# Patient Record
Sex: FEMALE | Race: WHITE | NOT HISPANIC OR LATINO | Employment: FULL TIME | URBAN - METROPOLITAN AREA
[De-identification: names, ages, dates, MRNs, and addresses within clinical notes are randomized per-mention and may not be internally consistent; named-entity substitution may affect disease eponyms.]

---

## 2017-02-10 ENCOUNTER — HOSPITAL ENCOUNTER (OUTPATIENT)
Facility: MEDICAL CENTER | Age: 37
End: 2017-02-10
Attending: FAMILY MEDICINE
Payer: MEDICAID

## 2017-02-10 ENCOUNTER — OFFICE VISIT (OUTPATIENT)
Dept: MEDICAL GROUP | Facility: MEDICAL CENTER | Age: 37
End: 2017-02-10
Attending: FAMILY MEDICINE
Payer: MEDICAID

## 2017-02-10 VITALS
TEMPERATURE: 97.9 F | HEART RATE: 80 BPM | HEIGHT: 62 IN | RESPIRATION RATE: 14 BRPM | WEIGHT: 125 LBS | SYSTOLIC BLOOD PRESSURE: 128 MMHG | OXYGEN SATURATION: 94 % | BODY MASS INDEX: 23 KG/M2 | DIASTOLIC BLOOD PRESSURE: 82 MMHG

## 2017-02-10 DIAGNOSIS — H50.9 STRABISMUS: ICD-10-CM

## 2017-02-10 DIAGNOSIS — Z79.891 CHRONIC USE OF OPIATE DRUGS THERAPEUTIC PURPOSES: ICD-10-CM

## 2017-02-10 DIAGNOSIS — F11.20 NARCOTIC DEPENDENCE, EPISODIC USE (HCC): ICD-10-CM

## 2017-02-10 DIAGNOSIS — M54.50 CHRONIC LOW BACK PAIN WITHOUT SCIATICA, UNSPECIFIED BACK PAIN LATERALITY: ICD-10-CM

## 2017-02-10 DIAGNOSIS — G89.29 CHRONIC LOW BACK PAIN WITHOUT SCIATICA, UNSPECIFIED BACK PAIN LATERALITY: ICD-10-CM

## 2017-02-10 PROCEDURE — 99214 OFFICE O/P EST MOD 30 MIN: CPT | Performed by: FAMILY MEDICINE

## 2017-02-10 PROCEDURE — 99204 OFFICE O/P NEW MOD 45 MIN: CPT | Performed by: FAMILY MEDICINE

## 2017-02-10 PROCEDURE — 80307 DRUG TEST PRSMV CHEM ANLYZR: CPT

## 2017-02-10 PROCEDURE — G0480 DRUG TEST DEF 1-7 CLASSES: HCPCS

## 2017-02-10 RX ORDER — MORPHINE SULFATE 15 MG/1
15 TABLET, FILM COATED, EXTENDED RELEASE ORAL 3 TIMES DAILY PRN
Qty: 21 TAB | Refills: 0 | Status: SHIPPED | OUTPATIENT
Start: 2017-02-10 | End: 2022-05-28

## 2017-02-10 RX ORDER — GABAPENTIN 300 MG/1
CAPSULE ORAL
Qty: 120 CAP | Refills: 3 | Status: SHIPPED | OUTPATIENT
Start: 2017-02-10 | End: 2017-02-27 | Stop reason: SDUPTHER

## 2017-02-10 ASSESSMENT — ENCOUNTER SYMPTOMS: DEPRESSION: 0

## 2017-02-10 ASSESSMENT — LIFESTYLE VARIABLES: HISTORY_ALCOHOL_USE: 0

## 2017-02-10 NOTE — MR AVS SNAPSHOT
"Lesia Ladd   2/10/2017 9:30 AM   Office Visit   MRN: [id number]    [facility]   Dept Phone:  [phone]    Description:  Female : 1980   Provider:  Ash Meza M.D.           Allergies as of 2/10/2017     No Known Allergies      You were diagnosed with     Chronic low back pain without sciatica, unspecified back pain laterality   [7950236]       Narcotic dependence, episodic use (CMS-[facility])   [[id number]]       Chronic use of opiate drugs therapeutic purposes   [[id number]]         Vital Signs     Blood Pressure Pulse Temperature Respirations Height Weight    128/82 mmHg 80 36.6 °C (97.9 °F) 14 1.575 m (5' 2\") 56.7 kg (125 lb)    Body Mass Index Oxygen Saturation Smoking Status             22.86 kg/m2 94% Current Every Day Smoker         Basic Information     Date Of Birth Sex Race Ethnicity Preferred Language    1980 Female White Non- English      Your appointments     2017  2:30 PM   Established Patient with Ash Meza M.D.   [facility]    [address] [place] [id number] [phone]           You will be receiving a confirmation call a few days before your appointment from our automated call confirmation system.            Mar 24, 2017 10:00 AM   Initial Psychiatric Eval with Jose Youngblood M.D.   [facility] [address] [place] [address])    [address] [place] [id number]   [phone]              Health Maintenance        Date Due Completion Dates    IMM DTaP/Tdap/Td Vaccine (1 - Tdap) 1999 ---    IMM INFLUENZA (1) 2016 ---    PAP SMEAR 2018, 2015 (Done), 10/8/2010    Override on 2015: Done            Current Immunizations     No immunizations on file.      Below and/or attached are the medications your provider expects you to take. Review all of your home medications and newly ordered medications with your provider and/or pharmacist. Follow medication instructions as " directed by your provider and/or pharmacist. Please keep your medication list with you and share with your provider. Update the information when medications are discontinued, doses are changed, or new medications (including over-the-counter products) are added; and carry medication information at all times in the event of emergency situations     Allergies:  No Known Allergies          Medications  Valid as of: February 10, 2017 - 10:33 AM    Generic Name Brand Name Tablet Size Instructions for use    Gabapentin (Cap) NEURONTIN 300 MG 1 by mouth daily at bedtime ×2 days then 1 by mouth twice a day ×2 days, then 2 by mouth daily at bedtime with one in the morning ×2 days then 2 by mouth twice a day        Morphine Sulfate (Tab CR) MS CONTIN 15 MG Take 1 Tab by mouth 3 times a day as needed.        Naproxen (Tab) NAPROSYN 500 MG Take 500 mg by mouth 2 times a day, with meals.        Oxycodone-Acetaminophen (Tab) PERCOCET 5-325 MG Take 1 Tab by mouth every four hours as needed (pain).        Oxycodone-Acetaminophen (Tab) PERCOCET 5-325 MG Take 1-2 Tabs by mouth every four hours as needed (pain).        .                 Medicines prescribed today were sent to:     [facility] [address] [place] [address] - [address] [address]    Phone: [phone] Fax: [phone]    Open 24 Hours?: No      Medication refill instructions:       If your prescription bottle indicates you have medication refills left, it is not necessary to call your provider’s office. Please contact your pharmacy and they will refill your medication.    If your prescription bottle indicates you do not have any refills left, you may request refills at any time through one of the following ways: The online Team My Mobile system (except Urgent Care), by calling your provider’s office, or by asking your pharmacy to contact your provider’s office with a refill request. Medication refills are processed only during regular  business hours and may not be available until the next business day. Your provider may request additional information or to have a follow-up visit with you prior to refilling your medication.   *Please Note: Medication refills are assigned a new Rx number when refilled electronically. Your pharmacy may indicate that no refills were authorized even though a new prescription for the same medication is available at the pharmacy. Please request the medicine by name with the pharmacy before contacting your provider for a refill.        Your To Do List     Future Labs/Procedures Complete By Expires    DX-LUMBAR SPINE-4+ VIEWS  As directed 2/10/2018    PAIN MANAGEMENT PANEL, SCRN W/ RFLX TO QNT  As directed 2/10/2018    Comments:    Current Meds (name, sig, last dose):   Current outpatient prescriptions:   •  gabapentin, 1 by mouth daily at bedtime ×2 days then 1 by mouth twice a day ×2 days, then 2 by mouth daily at bedtime with one in the morning ×2 days then 2 by mouth twice a day  •  morphine ER, 15 mg, Oral, TID PRN  •  naproxen, 500 mg, Oral, BID WITH MEALS, 5/2/2014 at 1130  •  oxycodone-acetaminophen, 1-2 Tab, Oral, Q4HRS PRN, not taking  •  oxycodone-acetaminophen, 1 Tab, Oral, Q4HRS PRN, Not Taking          PAIN MANAGEMENT PANEL, SCRN W/ RFLX TO QNT  As directed 2/10/2018    Comments:    Current Meds (name, sig, last dose):   Current outpatient prescriptions:   •  gabapentin, 1 by mouth daily at bedtime ×2 days then 1 by mouth twice a day ×2 days, then 2 by mouth daily at bedtime with one in the morning ×2 days then 2 by mouth twice a day  •  morphine ER, 15 mg, Oral, TID PRN  •  naproxen, 500 mg, Oral, BID WITH MEALS, 5/2/2014 at 1130  •  oxycodone-acetaminophen, 1-2 Tab, Oral, Q4HRS PRN, not taking  •  oxycodone-acetaminophen, 1 Tab, Oral, Q4HRS PRN, Not Taking               MyChart Access Code: [email or web address]  Expires: 3/12/2017 10:33 AM    [employer]ek[employer]  A secure, online tool to manage your health information        [employer]’s Fieldwire® is a secure, online tool that connects you to your personalized health information from the privacy of your home -- day or night - making it very easy for you to manage your healthcare. Once the activation process is completed, you can even access your medical information using the Fieldwire martha, which is available for free in the Apple Martha store or Google Play store.     Fieldwire provides the following levels of access (as shown below):   My Chart Features   [facility] Primary [facility] Doctor [facility]  Specialists [facility]  Urgent  Care Non-[facility]  Primary Care  Doctor   Email your healthcare team securely and privately 24/7 X X X    Manage appointments: schedule your next appointment; view details of past/upcoming appointments X      Request prescription refills. X      View recent personal medical records, including lab and immunizations X X X X   View health record, including health history, allergies, medications X X X X   Read reports about your outpatient visits, procedures, consult and ER notes X X X X   See your discharge summary, which is a recap of your hospital and/or ER visit that includes your diagnosis, lab results, and care plan. X X       How to register for Fieldwire:  1. Go to  [email or web address].  2. Click on the Sign Up Now box, which takes you to the New Member Sign Up page. You will need to provide the following information:  a. Enter your Fieldwire Access Code exactly as it appears at the top of this page. (You will not need to use this code after you’ve completed the sign-up process. If you do not sign up before the expiration date, you must request a new code.)   b. Enter your date of birth.   c. Enter your home email address.   d. Click Submit, and follow the next screen’s instructions.  3. Create a Fieldwire ID. This will be your Fieldwire login ID and cannot be changed, so think of one that is secure and easy to remember.  4. Create a Fieldwire password. You can change your  password at any time.  5. Enter your Password Reset Question and Answer. This can be used at a later time if you forget your password.   6. Enter your e-mail address. This allows you to receive e-mail notifications when new information is available in Simmr.  7. Click Sign Up. You can now view your health information.    For assistance activating your Simmr account, call [phone]

## 2017-02-10 NOTE — PROGRESS NOTES
No chief complaint on file.        HISTORY OF THE PRESENT ILLNESS: Patient is a 37 y.o. female. This pleasant patient is here today to establish care, requests assistance terminating her use of narcotics, and be evaluated for chronic low back pain      Chronic low back pain without sciatica  Patient presents with daily current low back pain without radiation into either leg. She denies tripping over either leg but feels at times that she may have some mild weakness in her left lower leg. (She reports that this leg was casted when she was in high school for a patellar issue.) Patient denies any urinary incontinence, fecal incontinence, focal numbness. Patient reports that she repeatedly takes doses of narcotics to cope with this pain. She reports that she has a fairly physical job that involves a lot of walking at a [employer].    Narcotic dependence, episodic use (CMS-HCC)  Patient presents today as a new patient requesting assistance discontinuing narcotics. She reports that she will continuously by narcotics off the street to treat her low back pain. She has realized over the past year that she increasingly has buying narcotics to avoid getting into the more severe levels of drug withdrawal. She reports taking MS Contin 60 mg 3 times a day and will take that for several days. Then she will run out of pills and reports that within 48 hours of no narcotic she'll start to experience chills and sweats along with anxiety and disrupted sleep. She has reached a point where she would like to terminate her use of narcotics. In the past she reports abusing methamphetamine at approximately age 20 and subsequently was able to procure legal prescriptions for Adderall for a period of time. She denies any current use of alcohol or street drugs beyond the opiates discussed above. She reports that she is able to attend work. She also takes care of her 2 young children. .  ORT score-7   Social History-  2014, working, 2 younger children      Allergies: Review of patient's allergies indicates no known allergies.    Current Outpatient Prescriptions Ordered in [facility]   Medication Sig Dispense Refill   • gabapentin (NEURONTIN) 300 MG Cap 1 by mouth daily at bedtime ×2 days then 1 by mouth twice a day ×2 days, then 2 by mouth daily at bedtime with one in the morning ×2 days then 2 by mouth twice a day 120 Cap 3   • morphine ER (MS CONTIN) 15 MG Tab CR tablet Take 1 Tab by mouth 3 times a day as needed. 21 Tab 0   • naproxen (NAPROSYN) 500 MG TABS Take 500 mg by mouth 2 times a day, with meals.     • oxycodone-acetaminophen (PERCOCET) 5-325 MG TABS Take 1-2 Tabs by mouth every four hours as needed (pain). 10 Each 0   • oxycodone-acetaminophen (PERCOCET) 5-325 MG TABS Take 1 Tab by mouth every four hours as needed (pain). 8 Each 0     No current [facility]-ordered facility-administered medications on file.       Past Medical History   Diagnosis Date   • Bronchitis    • Pneumonia    • Ovarian cyst      S/p RSO for cyst with last pregnancy   • Migraine    • UTI of         Past Surgical History   Procedure Laterality Date   • Oophorectomy       The patient did not have a hysterectomy only a RSO,benign tumor   • Wrist exploration Left      age 17       Social History   Substance Use Topics   • Smoking status: Current Every Day Smoker -- 0.50 packs/day   • Smokeless tobacco: Never Used      Comment: <1/2ppd x 15 years   • Alcohol Use: No       Family History   Problem Relation Age of Onset   • Stroke Paternal Grandmother    • Cancer Neg Hx    • Diabetes Neg Hx    • Heart Disease Neg Hx        Review of Systems   Constitutional: Negative for fever, chills, weight loss and malaise/fatigue.   HENT: Negative for ear pain, nosebleeds, congestion, sore throat and neck pain.    Eyes: Negative for blurred vision.   Respiratory: Negative for cough, sputum production, shortness of breath and wheezing.    Cardiovascular: Negative for  "chest pain, palpitations, orthopnea and leg swelling.   Gastrointestinal: Negative for heartburn, nausea, vomiting and abdominal pain.   Genitourinary: Negative for dysuria, urgency and frequency.   Musculoskeletal: Positive for for myalgias, back pain and negative for joint pain.   Skin: Negative for rash and itching.   Neurological: Negative for dizziness, tingling, tremors, sensory change, focal weakness and headaches.   Endo/Heme/Allergies: Does not bruise/bleed easily.   Psychiatric/Behavioral: Negative for depression, anxiety, or memory loss.            Exam: Blood pressure 128/82, pulse 80, temperature 36.6 °C (97.9 °F), resp. rate 14, height 1.575 m (5' 2\"), weight 56.7 kg (125 lb), SpO2 94 %.  General: Normal appearing. No distress.  HEENT: Normocephalic. Eyes conjunctiva clear lids without ptosis, pupils equal and reactive to light accommodation, ears normal shape and contour, canals are clear bilaterally, tympanic membranes are benign, nasal mucosa benign, oropharynx is without erythema, edema or exudates.   Neck: Supple without JVD or bruit. Thyroid is not enlarged.  Pulmonary: Clear to ausculation.  Normal effort. No rales, ronchi, or wheezing.  Cardiovascular: Regular rate and rhythm without murmur. Carotid and radial pulses are intact and equal bilaterally.  Abdomen: Soft, nontender, nondistended. Normal bowel sounds. Liver and spleen are not palpable  Neurologic: Intact light touch and strength bilaterally,normal speech, no tremor, normal gait.   Lymph: No cervical, supraclavicular or axillary lymph nodes are palpable  Skin: Warm and dry.  No obvious lesions.  Musculoskeletal: Normal gait. No extremity cyanosis, clubbing, or edema.  Psych: Normal mood and affect. Alert and oriented x3. Judgment and insight is normal.  Back-1+ tender L4-S2 in the midline and the left para lumbar area without overlying redness or swelling.  Lower extremities-intact light touch and strength bilaterally. Normal " gait    Please note that this dictation was created using voice recognition software. I have made every reasonable attempt to correct obvious errors, but I expect that there are errors of grammar and possibly content that I did not discover before finalizing the note.      Assessment/Plan  1. Chronic low back pain without sciatica, unspecified back pain laterality  DX-LUMBAR SPINE-4+ VIEWS    Controlled Substance Treatment Agreement    PAIN MANAGEMENT PANEL, SCRN W/ RFLX TO QNT   2. Narcotic dependence, episodic use (CMS-HCC)  PAIN MANAGEMENT PANEL, SCRN W/ RFLX TO QNT   3. Chronic use of opiate drugs therapeutic purposes  PAIN MANAGEMENT PANEL, SCRN W/ RFLX TO QNT    Controlled Substance Treatment Agreement    PAIN MANAGEMENT PANEL, SCRN W/ RFLX TO QNT   Obtained and reviewed patient utilization report from [facility] database on 2/10/17, and based on assessment of report, the prescription forMS Contin 15mg is medically necessary    Plan: 1. Reduce reported narcotic dosing down to 15 mg morphine sulfate extended release 3 times daily  2. Urine drug screen today  3. Controlled substance agreement completed today  4. Begin gabapentin 300 mg at at bedtime with steady increase in dose over 8 days to 600 mg twice a day  5. Advise against concurrent use of benzodiazepine's with her narcotics  6. Revisit in 1 week

## 2017-02-10 NOTE — ASSESSMENT & PLAN NOTE
Patient presents today as a new patient requesting assistance discontinuing narcotics. She reports that she will continuously by narcotics off the street to treat her low back pain. She has realized over the past year that she increasingly has buying narcotics to avoid getting into the more severe levels of drug withdrawal. She reports taking MS Contin 60 mg 3 times a day and will take that for several days. Then she will run out of pills and reports that within 48 hours of no narcotic she'll start to experience chills and sweats along with anxiety and disrupted sleep. She has reached a point where she would like to terminate her use of narcotics. In the past she reports abusing methamphetamine at approximately age 20 and subsequently was able to procure legal prescriptions for Adderall for a period of time. She denies any current use of alcohol or street drugs beyond the opiates discussed above. She reports that she is able to attend work. She also takes care of her 2 young children. .

## 2017-02-10 NOTE — ASSESSMENT & PLAN NOTE
Patient presents with daily current low back pain without radiation into either leg. She denies tripping over either leg but feels at times that she may have some mild weakness in her left lower leg. (She reports that this leg was casted when she was in high school for a patellar issue.) Patient denies any urinary incontinence, fecal incontinence, focal numbness. Patient reports that she repeatedly takes doses of narcotics to cope with this pain. 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

## 2017-02-12 LAB
AMPHET CTO UR CFM-MCNC: NEGATIVE NG/ML
BARBITURATES CTO UR CFM-MCNC: NEGATIVE NG/ML
BENZODIAZ CTO UR CFM-MCNC: NEGATIVE NG/ML
BUPRENORPHINE UR-MCNC: NEGATIVE NG/ML
CANNABINOIDS CTO UR CFM-MCNC: NEGATIVE NG/ML
CARISOPRODOL UR-MCNC: NEGATIVE NG/ML
COCAINE CTO UR CFM-MCNC: NEGATIVE NG/ML
DRUG SCREEN COMMENT UR-IMP: NORMAL
ETHYL GLUCURONIDE UR QL SCN: NEGATIVE NG/ML
FENTANYL UR-MCNC: NEGATIVE NG/ML
MEPERIDINE CTO UR SCN-MCNC: NEGATIVE NG/ML
METHADONE CTO UR CFM-MCNC: NEGATIVE NG/ML
OPIATES UR QL SCN: POSITIVE NG/ML
OXYCDOXYM URSCRN 2005102: NEGATIVE NG/ML
PCP CTO UR CFM-MCNC: NEGATIVE NG/ML
PROPOXYPH CTO UR CFM-MCNC: NEGATIVE NG/ML
TAPENTADOL UR-MCNC: NEGATIVE NG/ML
TRAMADOL CTO UR SCN-MCNC: NEGATIVE NG/ML
ZOLPIDEM UR-MCNC: NEGATIVE NG/ML

## 2017-02-13 LAB
6MAM UR CFM-MCNC: <10 NG/ML
CODEINE UR CFM-MCNC: <20 NG/ML
HYDROCODONE UR CFM-MCNC: <20 NG/ML
HYDROMORPHONE UR CFM-MCNC: <20 NG/ML
MORPHINE UR CFM-MCNC: 1071 NG/ML
NORHYDROCODONE UR CFM-MCNC: 81 NG/ML
NOROXYCODONE UR CFM-MCNC: <20 NG/ML
OPIATES UR NOROXYM Q0836: <20 NG/ML
OXYCODONE UR CFM-MCNC: <20 NG/ML
OXYMORPHONE UR CFM-MCNC: <20 NG/ML

## 2017-02-27 RX ORDER — GABAPENTIN 300 MG/1
600 CAPSULE ORAL 2 TIMES DAILY
Qty: 120 CAP | Refills: 3 | Status: SHIPPED | OUTPATIENT
Start: 2017-02-27 | End: 2018-03-16

## 2017-02-27 NOTE — TELEPHONE ENCOUNTER
Pt called stating that her gabapentin works well she just finds that she needs to take more than prescribed sometimes. She would like to know if you would be willing to increase the strength of her medication. Please advise    Was the patient seen in the last year in this department? Yes     Does patient have an active prescription for medications requested? No     Received Request Via: Patient

## 2017-03-01 NOTE — TELEPHONE ENCOUNTER
Pt states that she is unable to fill the Rx for gabapentin since the strength has not changed. She is requesting an rx for 600mg bid. Please advise     Thank you

## 2017-03-02 ENCOUNTER — TELEPHONE (OUTPATIENT)
Dept: MEDICAL GROUP | Facility: MEDICAL CENTER | Age: 37
End: 2017-03-02

## 2017-03-02 RX ORDER — GABAPENTIN 600 MG/1
600 TABLET ORAL 2 TIMES DAILY
Qty: 60 TAB | Refills: 11 | Status: SHIPPED | OUTPATIENT
Start: 2017-03-02 | End: 2018-03-16

## 2017-04-18 ENCOUNTER — TELEPHONE (OUTPATIENT)
Dept: MEDICAL GROUP | Facility: MEDICAL CENTER | Age: 37
End: 2017-04-18

## 2017-04-18 ENCOUNTER — OFFICE VISIT (OUTPATIENT)
Dept: MEDICAL GROUP | Facility: MEDICAL CENTER | Age: 37
End: 2017-04-18
Attending: FAMILY MEDICINE
Payer: MEDICAID

## 2017-04-18 VITALS
OXYGEN SATURATION: 96 % | HEART RATE: 100 BPM | TEMPERATURE: 98.6 F | RESPIRATION RATE: 16 BRPM | HEIGHT: 62 IN | SYSTOLIC BLOOD PRESSURE: 124 MMHG | WEIGHT: 135 LBS | BODY MASS INDEX: 24.84 KG/M2 | DIASTOLIC BLOOD PRESSURE: 84 MMHG

## 2017-04-18 DIAGNOSIS — G89.29 CHRONIC LEFT-SIDED LOW BACK PAIN WITHOUT SCIATICA: ICD-10-CM

## 2017-04-18 DIAGNOSIS — F19.982: ICD-10-CM

## 2017-04-18 DIAGNOSIS — F11.20 NARCOTIC DEPENDENCE, EPISODIC USE (HCC): ICD-10-CM

## 2017-04-18 DIAGNOSIS — M54.50 CHRONIC LEFT-SIDED LOW BACK PAIN WITHOUT SCIATICA: ICD-10-CM

## 2017-04-18 PROCEDURE — 99212 OFFICE O/P EST SF 10 MIN: CPT | Performed by: FAMILY MEDICINE

## 2017-04-18 PROCEDURE — 99213 OFFICE O/P EST LOW 20 MIN: CPT | Performed by: FAMILY MEDICINE

## 2017-04-18 RX ORDER — GABAPENTIN 800 MG/1
800 TABLET ORAL 3 TIMES DAILY
Qty: 90 TAB | Refills: 6 | Status: SHIPPED | OUTPATIENT
Start: 2017-04-18 | End: 2017-05-12 | Stop reason: SDUPTHER

## 2017-04-18 RX ORDER — LORAZEPAM 0.5 MG/1
0.5 TABLET ORAL
Qty: 5 TAB | Refills: 0 | Status: SHIPPED | OUTPATIENT
Start: 2017-04-18 | End: 2022-05-28

## 2017-04-18 ASSESSMENT — PAIN SCALES - GENERAL: PAINLEVEL: NO PAIN

## 2017-04-18 ASSESSMENT — ENCOUNTER SYMPTOMS: BACK PAIN: 1

## 2017-04-18 NOTE — PROGRESS NOTES
"Subjective:      Lesia Ladd is a 37 y.o. female who presents with Back Pain            Back Pain     1. Narcotic dependence-patient reports she is now 5 days without taking any morphine or any other opiates. She wants to continue opiate free indefinitely and she reports she has discontinued opiates on 2 prior occasions and remained opiate free for up to 12 months. Notes difficulty staying asleep to clear over the past several nights and feels that she is getting very little restful sleep overnight. She interestingly is not experiencing significant sleep latency. When she awakens during the night she notices strong feelings of anxiety at that time area  2. Chronic lumbar pain-patient notes persistent daily low back ache with some radiation into her left left upper thigh. No difficulty with urination or defecation (no constipation off narcotics).    Review of Systems   Musculoskeletal: Positive for back pain.          Objective:     /84 mmHg  Pulse 100  Temp(Src) 37 °C (98.6 °F)  Resp 16  Ht 1.575 m (5' 2.01\")  Wt 61.236 kg (135 lb)  BMI 24.69 kg/m2  SpO2 96%  LMP 04/03/2017  Breastfeeding? No     Physical Exam  Gen.- alert, cooperative, in no acute distress  Neck- midline trachea, thyroid not enlarged or tender,supple, no cervical adenopathy  Chest-clear to auscultation and percussion with normal breath sounds. No retractions. Chest wall nontender  Cardiac- regular rhythm and rate. No murmur, thrill, or heave  Back-1+ tender left parasacral area.  Lower extremities-intact light touch. Intact strength. Normal gait  Psych-normal affect with good eye contact. Normal grooming. Oriented x4.            Assessment/Plan:     1. Narcotic dependence, episodic use (CMS-HCC)      2. Sleep disorder, drug-induced (CMS-HCC)    3. Chronic low back pain  Plan: 1. Continue off all opiates  2. Limited use, 5 doses of lorazepam 0.5 mg that can be taken once per night if needed for sleeplessness/anxiety  3. Revisit " 2-3 mos or sooner if needed  4. Increase gabapentin to 800 mg 3 times a day

## 2017-04-18 NOTE — MR AVS SNAPSHOT
"        Lesia Ladd   2017 7:30 AM   Office Visit   MRN: [id number]    [facility]   Dept Phone:  [phone]    Description:  Female : 1980   Provider:  Ash Meza M.D.           Reason for Visit     Back Pain           Allergies as of 2017     No Known Allergies      You were diagnosed with     Narcotic dependence, episodic use (CMS-HCC)   [035383]       Sleep disorder, drug-induced (CMS-HCC)   [835458]       Chronic left-sided low back pain without sciatica   [9225421]         Vital Signs     Blood Pressure Pulse Temperature Respirations Height Weight    124/84 mmHg 100 37 °C (98.6 °F) 16 1.575 m (5' 2.01\") 61.236 kg (135 lb)    Body Mass Index Oxygen Saturation Last Menstrual Period Breastfeeding? Smoking Status       24.69 kg/m2 96% 2017 No Current Every Day Smoker       Basic Information     Date Of Birth Sex Race Ethnicity Preferred Language    1980 Female White Non- English      Your appointments     May 04, 2017 11:00 AM   Initial Psychiatric Eval with Jose Youngblood M.D.   [facility] [address] [place] [address])    [address] [place] [address]  [address] [place] [id number]   [phone]              Problem List              ICD-10-CM Priority Class Noted - Resolved    Chronic low back pain without sciatica M54.5, G89.29   2/10/2017 - Present    Narcotic dependence, episodic use (CMS-HCC) F11.20   2/10/2017 - Present    Strabismus H50.9   2/10/2017 - Present      Health Maintenance        Date Due Completion Dates    IMM DTaP/Tdap/Td Vaccine (1 - Tdap) 1999 ---    PAP SMEAR 2018, 2015 (Done), 10/8/2010    Override on 2015: Done            Current Immunizations     No immunizations on file.      Below and/or attached are the medications your provider expects you to take. Review all of your home medications and newly ordered medications with your provider and/or pharmacist. Follow medication instructions as directed by your " provider and/or pharmacist. Please keep your medication list with you and share with your provider. Update the information when medications are discontinued, doses are changed, or new medications (including over-the-counter products) are added; and carry medication information at all times in the event of emergency situations     Allergies:  No Known Allergies          Medications  Valid as of: April 18, 2017 -  8:20 AM    Generic Name Brand Name Tablet Size Instructions for use    Gabapentin (Cap) NEURONTIN 300 MG Take 2 Caps by mouth 2 Times a Day. 1 by mouth daily at bedtime ×2 days then 1 by mouth twice a day ×2 days, then 2 by mouth daily at bedtime with one in the morning ×2 days then 2 by mouth twice a day        Gabapentin (Tab) NEURONTIN 600 MG Take 1 Tab by mouth 2 times a day.        Gabapentin (Tab) NEURONTIN 800 MG Take 1 Tab by mouth 3 times a day.        LORazepam (Tab) ATIVAN 0.5 MG Take 1 Tab by mouth 1 time daily as needed for Anxiety.        Morphine Sulfate (Tab CR) MS CONTIN 15 MG Take 1 Tab by mouth 3 times a day as needed.        Naproxen (Tab) NAPROSYN 500 MG Take 500 mg by mouth 2 times a day, with meals.        Oxycodone-Acetaminophen (Tab) PERCOCET 5-325 MG Take 1 Tab by mouth every four hours as needed (pain).        Oxycodone-Acetaminophen (Tab) PERCOCET 5-325 MG Take 1-2 Tabs by mouth every four hours as needed (pain).        .                 Medicines prescribed today were sent to:     [facility]ACY #[id number] - [place], [place] - [address] [address]    Phone: [phone] Fax: [phone]    Open 24 Hours?: No      Medication refill instructions:       If your prescription bottle indicates you have medication refills left, it is not necessary to call your provider’s office. Please contact your pharmacy and they will refill your medication.    If your prescription bottle indicates you do not have any refills left, you may request refills at any time through  one of the following ways: The online dbTwang system (except Urgent Care), by calling your provider’s office, or by asking your pharmacy to contact your provider’s office with a refill request. Medication refills are processed only during regular business hours and may not be available until the next business day. Your provider may request additional information or to have a follow-up visit with you prior to refilling your medication.   *Please Note: Medication refills are assigned a new Rx number when refilled electronically. Your pharmacy may indicate that no refills were authorized even though a new prescription for the same medication is available at the pharmacy. Please request the medicine by name with the pharmacy before contacting your provider for a refill.           dbTwang Access Code: [email or web address]  Expires: 5/18/2017  8:20 AM    dbTwang  A secure, online tool to manage your health information     [employer] dbTwang® is a secure, online tool that connects you to your personalized health information from the privacy of your home -- day or night - making it very easy for you to manage your healthcare. Once the activation process is completed, you can even access your medical information using the dbTwang martha, which is available for free in the Apple Martha store or Google Play store.     dbTwang provides the following levels of access (as shown below):   My Chart Features   [facility] Doctor [facility]  Specialists [facility]  Urgent  Care Non-[facility]  Primary Care  Doctor   Email your healthcare team securely and privately 24/7 X X X    Manage appointments: schedule your next appointment; view details of past/upcoming appointments X      Request prescription refills. X      View recent personal medical records, including lab and immunizations X X X X   View health record, including health history, allergies, medications X X X X   Read reports about your outpatient visits, procedures, consult and ER  notes X X X X   See your discharge summary, which is a recap of your hospital and/or ER visit that includes your diagnosis, lab results, and care plan. X X       How to register for [email or web address]:  1. Go to  [email or web address].  2. Click on the Sign Up Now box, which takes you to the New Member Sign Up page. You will need to provide the following information:  a. Enter your Hotlease.Com Access Code exactly as it appears at the top of this page. (You will not need to use this code after you’ve completed the sign-up process. If you do not sign up before the expiration date, you must request a new code.)   b. Enter your date of birth.   c. Enter your home email address.   d. Click Submit, and follow the next screen’s instructions.  3. Create a Hotlease.Com ID. This will be your Hotlease.Com login ID and cannot be changed, so think of one that is secure and easy to remember.  4. Create a Hotlease.Com password. You can change your password at any time.  5. Enter your Password Reset Question and Answer. This can be used at a later time if you forget your password.   6. Enter your e-mail address. This allows you to receive e-mail notifications when new information is available in [email or web address].  7. Click Sign Up. You can now view your health information.    For assistance activating your [email or web address] account, call [phone]        Quit Tobacco Information     Do you want to quit using tobacco?    Quitting tobacco decreases risks of cancer, heart and lung disease, increases life expectancy, improves sense of taste and smell, and increases spending money, among other benefits.    If you are thinking about quitting, we can help.  • [employer] Quit Tobacco Program: 616.447.6160  o Program occurs weekly for four weeks and includes pharmacist consultation on products to support quitting smoking or chewing tobacco. A provider referral is needed for pharmacist consultation.  • Tobacco Users Help Hotline: 9-789-QUIT-NOW (764-6657) or  https://[place].quitlogix.org/  o Free, confidential telephone and online coaching for [place] residents. Sessions are designed on a schedule that is convenient for you. Eligible clients receive free nicotine replacement therapy.  • Nationally: www.smokefree.gov  o Information and professional assistance to support both immediate and long-term needs as you become, and remain, a non-smoker. Smokefree.gov allows you to choose the help that best fits your needs.

## 2017-05-04 ENCOUNTER — APPOINTMENT (OUTPATIENT)
Dept: BEHAVIORAL HEALTH | Facility: PHYSICIAN GROUP | Age: 37
End: 2017-05-04

## 2017-05-05 ENCOUNTER — TELEPHONE (OUTPATIENT)
Dept: MEDICAL GROUP | Facility: MEDICAL CENTER | Age: 37
End: 2017-05-05

## 2017-05-05 NOTE — TELEPHONE ENCOUNTER
Wt gain associated with gabapentin is usually under 5 lb and would not occur in such a short time. Need to limit daily portions to allow wt reduction. Appetite suppressants are stimulants, not a good drug class for her. They also have minimal benefit.   B

## 2017-05-05 NOTE — TELEPHONE ENCOUNTER
1. Caller Name: Lesia                                           Call Back Number: [phone] (home)         Patient approves a detailed voicemail message: yes    Pt called stating that since using gabapentin she has gained about 20 pounds. She researched the gabapentin and it says that it could possibly cause weight gain. Pt is exercising daily and eating healthy, but is still gaining weight. She would like to know if you could Rx an appetite suppresant to help with this situation. Please advise

## 2017-05-12 ENCOUNTER — OFFICE VISIT (OUTPATIENT)
Dept: MEDICAL GROUP | Facility: MEDICAL CENTER | Age: 37
End: 2017-05-12
Attending: FAMILY MEDICINE
Payer: MEDICAID

## 2017-05-12 VITALS
DIASTOLIC BLOOD PRESSURE: 76 MMHG | BODY MASS INDEX: 24.84 KG/M2 | RESPIRATION RATE: 12 BRPM | TEMPERATURE: 99.1 F | HEIGHT: 62 IN | SYSTOLIC BLOOD PRESSURE: 110 MMHG | WEIGHT: 135 LBS | HEART RATE: 88 BPM | OXYGEN SATURATION: 98 %

## 2017-05-12 DIAGNOSIS — M54.50 CHRONIC LEFT-SIDED LOW BACK PAIN WITHOUT SCIATICA: ICD-10-CM

## 2017-05-12 DIAGNOSIS — M25.562 LEFT KNEE PAIN, UNSPECIFIED CHRONICITY: ICD-10-CM

## 2017-05-12 DIAGNOSIS — G89.29 CHRONIC LEFT-SIDED LOW BACK PAIN WITHOUT SCIATICA: ICD-10-CM

## 2017-05-12 PROCEDURE — 99212 OFFICE O/P EST SF 10 MIN: CPT | Performed by: FAMILY MEDICINE

## 2017-05-12 PROCEDURE — 99213 OFFICE O/P EST LOW 20 MIN: CPT | Performed by: FAMILY MEDICINE

## 2017-05-12 RX ORDER — GABAPENTIN 800 MG/1
1600 TABLET ORAL 3 TIMES DAILY
Qty: 180 TAB | Refills: 6 | Status: SHIPPED | OUTPATIENT
Start: 2017-05-12 | End: 2018-03-16

## 2017-05-12 RX ORDER — GABAPENTIN 300 MG/1
600 CAPSULE ORAL 3 TIMES DAILY
Qty: 20 CAP | Refills: 0 | Status: SHIPPED | OUTPATIENT
Start: 2017-05-12 | End: 2018-03-16

## 2017-05-12 ASSESSMENT — PATIENT HEALTH QUESTIONNAIRE - PHQ9: CLINICAL INTERPRETATION OF PHQ2 SCORE: 0

## 2017-05-12 ASSESSMENT — PAIN SCALES - GENERAL: PAINLEVEL: 3=SLIGHT PAIN

## 2017-05-12 NOTE — PROGRESS NOTES
"Subjective:      Lesia Ladd is a 37 y.o. female who presents with No chief complaint on file.            HPI 1. Chronic back pain-patient ports she has remained narcotic free now for 5 weeks. She reports that she has been relying heavily on gabapentin and is actually been taking 1600 mg 3 times daily with good relief of chronic back pain and recent left knee pain. She typically is not using NSAIDs or Tylenol. Denies any confusion, dizziness  2. Left knee pain-patient reports intermittent pain typically in the posterior aspect of her left knee on and off depending on her degree of physical activity dating back over several years. She does not report any recent specific injury to her left knee. She reports on questioning that it does lock frequently and rarely liv underneath her.  ROS negative for nausea, chest pain, dysuria, change in bowel habits       Objective:     /76 mmHg  Pulse 88  Temp(Src) 37.3 °C (99.1 °F)  Resp 12  Ht 1.575 m (5' 2.01\")  Wt 61.236 kg (135 lb)  BMI 24.69 kg/m2  SpO2 98%  LMP 05/04/2017  Breastfeeding? No     Physical Exam   Gen.- alert, cooperative, in no acute distress  Neck- midline trachea, thyroid not enlarged or tender,supple, no cervical adenopathy  Chest-clear to auscultation and percussion with normal breath sounds. No retractions. Chest wall nontender  Cardiac- regular rhythm and rate. No murmur, thrill, or heave  Psych-normal affect with good eye contact. Normal grooming. Oriented x4.  Left knee-negative for effusion redness or warmth. Mild tenderness on the opposite to the joint line with medial and lateral stress. Range of motion is 0-90° limited by discomfort and tightness. On palpation mildly tender over the hamstring insertions medially and laterally          Assessment/Plan:     1. Chronic left-sided low back pain without sciatica      2. Left knee pain, unspecified chronicity    Plan: 1. Patient is tolerating above normal doses of gabapentin 1600 mg " 3 times a day with good relief of her back/knee pain. Will reorder with a limited quantity of 300 mg size tablets to get her through the weekend  2. Patient declines formal evaluation of her left knee which is somewhat suggestive of a meniscal tear

## 2017-05-12 NOTE — MR AVS SNAPSHOT
"        Lesia Ladd   2017 12:50 PM   Office Visit   MRN: [id number]    Department[facility]   Dept Phone:  [phone]    Description:  Female : 1980   Provider:  Ash Meza M.D.           Reason for Visit     Follow-Up           Allergies as of 2017     No Known Allergies      You were diagnosed with     Chronic left-sided low back pain without sciatica   [1589967]       Left knee pain, unspecified chronicity   [3623383]         Vital Signs     Blood Pressure Pulse Temperature Respirations Height Weight    110/76 mmHg 88 37.3 °C (99.1 °F) 12 1.575 m (5' 2.01\") 61.236 kg (135 lb)    Body Mass Index Oxygen Saturation Last Menstrual Period Breastfeeding? Smoking Status       24.69 kg/m2 98% 2017 No Current Every Day Smoker       Basic Information     Date Of Birth Sex Race Ethnicity Preferred Language    1980 Female White Non- English      Problem List              ICD-10-CM Priority Class Noted - Resolved    Chronic low back pain without sciatica M54.5, G89.29   2/10/2017 - Present    Narcotic dependence, episodic use (CMS-HCC) F11.20   2/10/2017 - Present    Strabismus H50.9   2/10/2017 - Present      Health Maintenance        Date Due Completion Dates    IMM DTaP/Tdap/Td Vaccine (1 - Tdap) 1999 ---    PAP SMEAR 2018, 2015 (Done), 10/8/2010    Override on 2015: Done            Current Immunizations     No immunizations on file.      Below and/or attached are the medications your provider expects you to take. Review all of your home medications and newly ordered medications with your provider and/or pharmacist. Follow medication instructions as directed by your provider and/or pharmacist. Please keep your medication list with you and share with your provider. Update the information when medications are discontinued, doses are changed, or new medications (including over-the-counter products) are added; and carry medication " information at all times in the event of emergency situations     Allergies:  No Known Allergies          Medications  Valid as of: May 12, 2017 -  1:43 PM    Generic Name Brand Name Tablet Size Instructions for use    Gabapentin (Cap) NEURONTIN 300 MG Take 2 Caps by mouth 2 Times a Day. 1 by mouth daily at bedtime ×2 days then 1 by mouth twice a day ×2 days, then 2 by mouth daily at bedtime with one in the morning ×2 days then 2 by mouth twice a day        Gabapentin (Tab) NEURONTIN 600 MG Take 1 Tab by mouth 2 times a day.        Gabapentin (Tab) NEURONTIN 800 MG Take 2 Tabs by mouth 3 times a day.        Gabapentin (Cap) NEURONTIN 300 MG Take 2 Caps by mouth 3 times a day.        LORazepam (Tab) ATIVAN 0.5 MG Take 1 Tab by mouth 1 time daily as needed for Anxiety.        Morphine Sulfate (Tab CR) MS CONTIN 15 MG Take 1 Tab by mouth 3 times a day as needed.        Naproxen (Tab) NAPROSYN 500 MG Take 500 mg by mouth 2 times a day, with meals.        Oxycodone-Acetaminophen (Tab) PERCOCET 5-325 MG Take 1 Tab by mouth every four hours as needed (pain).        .                 Medicines prescribed today were sent to:     [facility]/PHARMACY #[id number] - [place], [place] - [address] [address]    Phone: [phone] Fax: [phone]    Open 24 Hours?: No      Medication refill instructions:       If your prescription bottle indicates you have medication refills left, it is not necessary to call your provider’s office. Please contact your pharmacy and they will refill your medication.    If your prescription bottle indicates you do not have any refills left, you may request refills at any time through one of the following ways: The online [employer] system (except Urgent Care), by calling your provider’s office, or by asking your pharmacy to contact your provider’s office with a refill request. Medication refills are processed only during regular business hours and may not be available until the next  business day. Your provider may request additional information or to have a follow-up visit with you prior to refilling your medication.   *Please Note: Medication refills are assigned a new Rx number when refilled electronically. Your pharmacy may indicate that no refills were authorized even though a new prescription for the same medication is available at the pharmacy. Please request the medicine by name with the pharmacy before contacting your provider for a refill.           MyChart Access Code: Activation code not generated  Current MyChart Status: Active          Quit Tobacco Information     Do you want to quit using tobacco?    Quitting tobacco decreases risks of cancer, heart and lung disease, increases life expectancy, improves sense of taste and smell, and increases spending money, among other benefits.    If you are thinking about quitting, we can help.  • [employer] Quit Tobacco Program: 599.117.7236  o Program occurs weekly for four weeks and includes pharmacist consultation on products to support quitting smoking or chewing tobacco. A provider referral is needed for pharmacist consultation.  • Tobacco Users Help Hotline: 2-789-QUIT-NOW (031-1446) or https://[place].quitlogix.org/  o Free, confidential telephone and online coaching for [place] residents. Sessions are designed on a schedule that is convenient for you. Eligible clients receive free nicotine replacement therapy.  • Nationally: www.smokefree.gov  o Information and professional assistance to support both immediate and long-term needs as you become, and remain, a non-smoker. Smokefree.gov allows you to choose the help that best fits your needs.

## 2017-05-15 ENCOUNTER — TELEPHONE (OUTPATIENT)
Dept: MEDICAL GROUP | Facility: MEDICAL CENTER | Age: 37
End: 2017-05-15

## 2017-05-15 RX ORDER — GABAPENTIN 800 MG/1
800 TABLET ORAL 4 TIMES DAILY
Qty: 120 TAB | Refills: 2 | Status: SHIPPED | OUTPATIENT
Start: 2017-05-15 | End: 2017-07-18 | Stop reason: SDUPTHER

## 2017-05-15 NOTE — TELEPHONE ENCOUNTER
1. Caller Name: Lesia                                        Call Back Number: [phone] (home)         Patient approves a detailed voicemail message: yes    Pt called stating that her Rx for Gabapentin 800mg requires a PA. She says that she discussed the possibility of this happening with you and that you stated that you would change it to her taking 4 pills daily for a total of 3200 mg  daily. Please advise   Thank you

## 2017-06-27 ENCOUNTER — OFFICE VISIT (OUTPATIENT)
Dept: MEDICAL GROUP | Facility: MEDICAL CENTER | Age: 37
End: 2017-06-27
Attending: FAMILY MEDICINE
Payer: MEDICAID

## 2017-06-27 VITALS
WEIGHT: 134 LBS | DIASTOLIC BLOOD PRESSURE: 72 MMHG | TEMPERATURE: 99 F | HEART RATE: 96 BPM | OXYGEN SATURATION: 95 % | HEIGHT: 62 IN | BODY MASS INDEX: 24.66 KG/M2 | SYSTOLIC BLOOD PRESSURE: 108 MMHG | RESPIRATION RATE: 16 BRPM

## 2017-06-27 DIAGNOSIS — G89.29 CHRONIC LEFT-SIDED LOW BACK PAIN WITH LEFT-SIDED SCIATICA: ICD-10-CM

## 2017-06-27 DIAGNOSIS — M54.6 ACUTE MIDLINE THORACIC BACK PAIN: ICD-10-CM

## 2017-06-27 DIAGNOSIS — M54.42 CHRONIC LEFT-SIDED LOW BACK PAIN WITH LEFT-SIDED SCIATICA: ICD-10-CM

## 2017-06-27 DIAGNOSIS — M54.50 CHRONIC LEFT-SIDED LOW BACK PAIN WITHOUT SCIATICA: ICD-10-CM

## 2017-06-27 DIAGNOSIS — G89.29 CHRONIC LEFT-SIDED LOW BACK PAIN WITHOUT SCIATICA: ICD-10-CM

## 2017-06-27 DIAGNOSIS — M25.512 CHRONIC LEFT SHOULDER PAIN: ICD-10-CM

## 2017-06-27 DIAGNOSIS — K21.9 GASTROESOPHAGEAL REFLUX DISEASE WITHOUT ESOPHAGITIS: ICD-10-CM

## 2017-06-27 DIAGNOSIS — Z00.00 HEALTHCARE MAINTENANCE: ICD-10-CM

## 2017-06-27 DIAGNOSIS — G89.29 CHRONIC LEFT SHOULDER PAIN: ICD-10-CM

## 2017-06-27 DIAGNOSIS — H50.9 STRABISMUS: ICD-10-CM

## 2017-06-27 DIAGNOSIS — H57.11 ACUTE RIGHT EYE PAIN: ICD-10-CM

## 2017-06-27 PROCEDURE — 99213 OFFICE O/P EST LOW 20 MIN: CPT | Performed by: FAMILY MEDICINE

## 2017-06-27 PROCEDURE — 99214 OFFICE O/P EST MOD 30 MIN: CPT | Performed by: FAMILY MEDICINE

## 2017-06-27 RX ORDER — CYCLOBENZAPRINE HCL 10 MG
10 TABLET ORAL 3 TIMES DAILY PRN
Qty: 90 TAB | Refills: 1 | Status: SHIPPED | OUTPATIENT
Start: 2017-06-27 | End: 2022-05-28

## 2017-06-27 RX ORDER — CYCLOBENZAPRINE HCL 10 MG
90 TABLET ORAL 3 TIMES DAILY PRN
Qty: 30 TAB | Refills: 0 | Status: SHIPPED | OUTPATIENT
Start: 2017-06-27 | End: 2017-06-27

## 2017-06-27 ASSESSMENT — PAIN SCALES - GENERAL: PAINLEVEL: 5=MODERATE PAIN

## 2017-06-27 NOTE — MR AVS SNAPSHOT
"        Lesia Ladd   2017 5:10 PM   Office Visit   MRN: [id number]    [facility]   Dept Phone:  [phone]    Description:  Female : 1980   Provider:  Ash Meza M.D.           Reason for Visit     Back Pain           Allergies as of 2017     No Known Allergies      You were diagnosed with     Healthcare maintenance   [377052]       Chronic left-sided low back pain with left-sided sciatica   [3397731]       Acute midline thoracic back pain   [5905935]       Acute right eye pain   [982913]       Strabismus   [419190]       Chronic left shoulder pain   [020447]         Vital Signs     Blood Pressure Pulse Temperature Respirations Height Weight    108/72 mmHg 96 37.2 °C (99 °F) 16 1.575 m (5' 2.01\") 60.782 kg (134 lb)    Body Mass Index Oxygen Saturation Last Menstrual Period Breastfeeding? Smoking Status       24.50 kg/m2 95% 2017 No Current Every Day Smoker       Basic Information     Date Of Birth Sex Race Ethnicity Preferred Language    1980 Female White Non- English      Your appointments     2017  4:30 PM   Established Patient with Ash Meza M.D.   [facility] ([facility])    [address] [place] [address] [id number] [phone]           You will be receiving a confirmation call a few days before your appointment from our automated call confirmation system.              Problem List              ICD-10-CM Priority Class Noted - Resolved    Chronic low back pain without sciatica M54.5, G89.29   2/10/2017 - Present    Narcotic dependence, episodic use (CMS-HCC) F11.20   2/10/2017 - Present    Strabismus H50.9   2/10/2017 - Present      Health Maintenance        Date Due Completion Dates    IMM DTaP/Tdap/Td Vaccine (1 - Tdap) 1999 ---    PAP SMEAR 2018, 2015 (Done), 10/8/2010    Override on 2015: Done            Current Immunizations     No immunizations on file.      Below and/or attached " are the medications your provider expects you to take. Review all of your home medications and newly ordered medications with your provider and/or pharmacist. Follow medication instructions as directed by your provider and/or pharmacist. Please keep your medication list with you and share with your provider. Update the information when medications are discontinued, doses are changed, or new medications (including over-the-counter products) are added; and carry medication information at all times in the event of emergency situations     Allergies:  No Known Allergies          Medications  Valid as of: June 27, 2017 -  5:55 PM    Generic Name Brand Name Tablet Size Instructions for use    Cyclobenzaprine HCl (Tab) FLEXERIL 10 MG Take 9 Tabs by mouth 3 times a day as needed.        Gabapentin (Cap) NEURONTIN 300 MG Take 2 Caps by mouth 2 Times a Day. 1 by mouth daily at bedtime ×2 days then 1 by mouth twice a day ×2 days, then 2 by mouth daily at bedtime with one in the morning ×2 days then 2 by mouth twice a day        Gabapentin (Tab) NEURONTIN 600 MG Take 1 Tab by mouth 2 times a day.        Gabapentin (Tab) NEURONTIN 800 MG Take 2 Tabs by mouth 3 times a day.        Gabapentin (Cap) NEURONTIN 300 MG Take 2 Caps by mouth 3 times a day.        Gabapentin (Tab) NEURONTIN 800 MG Take 1 Tab by mouth 4 times a day.        LORazepam (Tab) ATIVAN 0.5 MG Take 1 Tab by mouth 1 time daily as needed for Anxiety.        Morphine Sulfate (Tab CR) MS CONTIN 15 MG Take 1 Tab by mouth 3 times a day as needed.        .                 Medicines prescribed today were sent to:     [facility] #[id number] - [place] - [address] [place] [address]    Phone: [phone] Fax: [phone]    Open 24 Hours?: No      Medication refill instructions:       If your prescription bottle indicates you have medication refills left, it is not necessary to call your provider’s office. Please contact your pharmacy and they  will refill your medication.    If your prescription bottle indicates you do not have any refills left, you may request refills at any time through one of the following ways: The online Nvest system (except Urgent Care), by calling your provider’s office, or by asking your pharmacy to contact your provider’s office with a refill request. Medication refills are processed only during regular business hours and may not be available until the next business day. Your provider may request additional information or to have a follow-up visit with you prior to refilling your medication.   *Please Note: Medication refills are assigned a new Rx number when refilled electronically. Your pharmacy may indicate that no refills were authorized even though a new prescription for the same medication is available at the pharmacy. Please request the medicine by name with the pharmacy before contacting your provider for a refill.        Your To Do List     Future Labs/Procedures Complete By Expires    COMP METABOLIC PANEL  As directed 6/28/2018    DX-LUMBAR SPINE-4+ VIEWS  As directed 6/27/2018    DX-SHOULDER 2+ LEFT  As directed 6/27/2018    DX-THORACIC SPINE-2 VIEWS  As directed 6/27/2018    LIPID PROFILE  As directed 6/28/2018    TSH  As directed 6/28/2018      Referral     A referral request has been sent to our patient care coordination department. Please allow 3-5 business days for us to process this request and contact you either by phone or mail. If you do not hear from us by the 5th business day, please call us at [phone].           Nvest Access Code: Activation code not generated  Current Nvest Status: Active          Quit Tobacco Information     Do you want to quit using tobacco?    Quitting tobacco decreases risks of cancer, heart and lung disease, increases life expectancy, improves sense of taste and smell, and increases spending money, among other benefits.    If you are thinking about quitting, we can  help.  • [facility] Quit Tobacco Program: 501-857-4341  o Program occurs weekly for four weeks and includes pharmacist consultation on products to support quitting smoking or chewing tobacco. A provider referral is needed for pharmacist consultation.  • Tobacco Users Help Hotline: 0-865-QUIT-NOW (437-7663) or https://[place].quitlogix.org/  o Free, confidential telephone and online coaching for [place] residents. Sessions are designed on a schedule that is convenient for you. Eligible clients receive free nicotine replacement therapy.  • Nationally: www.smokefree.gov  o Information and professional assistance to support both immediate and long-term needs as you become, and remain, a non-smoker. Smokefree.gov allows you to choose the help that best fits your needs.

## 2017-06-28 NOTE — ASSESSMENT & PLAN NOTE
Patient reports persistent daily lower lumbosacral back pain with increasing radiation down her left leg all the way to her left foot. She denies urinary or fecal incontinence. She notes occasional numbness intermittently in her left foot in the past month. She overall gets significant relief from taking gabapentin but has been taking 6 tablets per day rather than the maximum for tablets of 800 mg each authorized by her health plan so she is almost out at this time until refill would be available around July 8. She never completed the requested lumbar spine x-rays in the early spring.

## 2017-06-28 NOTE — ASSESSMENT & PLAN NOTE
Patient reports past 2-3 months she has had recurrent sharp and dull pains in the intrascapular area. She denies any recent fall or trauma. Pain does not radiate into either upper extremity. No overlying rashes noted.

## 2017-06-28 NOTE — PROGRESS NOTES
"Chief Complaint   Patient presents with   • Back Pain       HISTORY OF PRESENT ILLNESS: Patient is a 37 y.o. female established patient who presents today to follow-up on chronic low back pain with sciatica        Chronic low back pain with sciatica  Patient reports persistent daily lower lumbosacral back pain with increasing radiation down her left leg all the way to her left foot. She denies urinary or fecal incontinence. She notes occasional numbness intermittently in her left foot in the past month. She overall gets significant relief from taking gabapentin but has been taking 6 tablets per day rather than the maximum for tablets of 800 mg each authorized by her health plan so she is almost out at this time until refill would be available around July 8. She never completed the requested lumbar spine x-rays in the early spring.     Gastroesophageal reflux disease without esophagitis  Patient reports she has been taking ibuprofen 200 mg, 4 pills 3 times a day, usually with food. She reports she started getting epigastric burning and substernal pain 2-3 weeks ago. She started taking what sounds like a over-the-counter low dose of ranitidine-? 75 mg, with resolution of the heartburn symptoms in the past week. She is not reporting black or bloody stools.    Strabismus  Patient reports intermittent strabismus dating back at least several years. Recently she repeatedly feels like there is a hair creating mild discomfort in her right eye intermittently. Typically there is no actual foreign object in her eye. She reports no obvious double vision, eye discharge or mattering. She does not wear glasses.    Chronic left shoulder pain  Patient notes intermittent aching just below her lateral left clavicle every day or 2 sometimes lasting all day sometimes just a few minutes. This is not related to effort. She reports that she is \"double jointed in her left shoulder and use to sublux it for entertainment value when she was a " kid. She has not done that for a number of years happily. She denies left arm numbness or radicular pain.    Acute midline thoracic back pain  Patient reports past 2-3 months she has had recurrent sharp and dull pains in the intrascapular area. She denies any recent fall or trauma. Pain does not radiate into either upper extremity. No overlying rashes noted.      Patient Active Problem List    Diagnosis Date Noted   • Acute midline thoracic back pain 06/27/2017   • Acute right eye pain 06/27/2017   • Chronic left shoulder pain 06/27/2017   • Gastroesophageal reflux disease without esophagitis 06/27/2017   • Chronic low back pain without sciatica 02/10/2017   • Narcotic dependence, episodic use (CMS-[facility]) 02/10/2017   • Strabismus 02/10/2017     Social history-, working  Allergies:Review of patient's allergies indicates no known allergies.    Current Outpatient Prescriptions   Medication Sig Dispense Refill   • cyclobenzaprine (FLEXERIL) 10 MG Tab Take 9 Tabs by mouth 3 times a day as needed. 30 Tab 0   • gabapentin (NEURONTIN) 800 MG tablet Take 1 Tab by mouth 4 times a day. 120 Tab 2   • gabapentin (NEURONTIN) 800 MG tablet Take 2 Tabs by mouth 3 times a day. 180 Tab 6   • gabapentin (NEURONTIN) 300 MG Cap Take 2 Caps by mouth 3 times a day. 20 Cap 0   • lorazepam (ATIVAN) 0.5 MG Tab Take 1 Tab by mouth 1 time daily as needed for Anxiety. 5 Tab 0   • gabapentin (NEURONTIN) 600 MG tablet Take 1 Tab by mouth 2 times a day. 60 Tab 11   • gabapentin (NEURONTIN) 300 MG Cap Take 2 Caps by mouth 2 Times a Day. 1 by mouth daily at bedtime ×2 days then 1 by mouth twice a day ×2 days, then 2 by mouth daily at bedtime with one in the morning ×2 days then 2 by mouth twice a day 120 Cap 3   • morphine ER (MS CONTIN) 15 MG Tab CR tablet Take 1 Tab by mouth 3 times a day as needed. 21 Tab 0     No current facility-administered medications for this visit.       Social History   Substance Use Topics   • Smoking status:  "Current Every Day Smoker -- 0.50 packs/day   • Smokeless tobacco: Never Used      Comment: <1/2ppd x 15 years   • Alcohol Use: No       Family History   Problem Relation Age of Onset   • Stroke Paternal Grandmother    • Cancer Neg Hx    • Diabetes Neg Hx    • Heart Disease Neg Hx        ROS:  Review of Systems   Constitutional: Negative for fever, chills, weight loss and malaise/fatigue.   -negative for vaginal discharge. Positive for history of cervix prolapse  Respiratory: Negative for cough, sputum production, shortness of breath and wheezing.    Cardiovascular: Negative for chest pain, palpitations, orthopnea and leg swelling.   . Endo/Heme/Allergies: Does not bruise/bleed easily.               Exam:  Blood pressure 108/72, pulse 96, temperature 37.2 °C (99 °F), resp. rate 16, height 1.575 m (5' 2.01\"), weight 60.782 kg (134 lb), last menstrual period 06/06/2017, SpO2 95 %, not currently breastfeeding.  General:  Well nourished, well developed female in NAD  Head is grossly normal. Eyes-intermittent moderate lateral wandering of the right eye noted. PERRLA. Sclera and conjunctiva are clear  Neck: Supple without JVD or bruit. Thyroid is not enlarged. Trachea is midline.  Pulmonary: Clear to ausculation .  Normal effort. No rales, ronchi, or wheezing.  Cardiovascular: Regular rate and rhythm without murmur.  Abdomen-Abdomen is soft, normal bowel sounds, no masses, guarding, ororganomegaly, or tenderness.  Lower extremities- neg for pretibial edema, redness, tenderness. Decreased touch over the lateral left thigh and left calf. Absent patellar and ankle jerks bilaterally  Upper extremities-lacks about 5° of full abduction at the left shoulder. 2+ tender over the acromioclavicular joint and just underneath the anterior left acromioclavicular area without mass. Intact light touch and distal strength bilaterally    Please note that this dictation was created using voice recognition software. I have made every " reasonable attempt to correct obvious errors, but I expect that there are errors of grammar and possibly content that I did not discover before finalizing the note.    Assessment/Plan:  1. Healthcare maintenance  TSH    COMP METABOLIC PANEL    LIPID PROFILE   2. Chronic left-sided low back pain with left-sided sciatica  DX-LUMBAR SPINE-4+ VIEWS   3. Acute midline thoracic back pain  DX-SHOULDER 2+ LEFT    DX-THORACIC SPINE-2 VIEWS   4. Acute right eye pain  REFERRAL TO OPHTHALMOLOGY   5. Strabismus  REFERRAL TO OPHTHALMOLOGY   6. Chronic left shoulder pain     7. Chronic left-sided low back pain without sciatica     8. Gastroesophageal reflux disease without esophagitis        Plan: 1. Collect TSH, CMP, fasting lipids  2. Ophthalmology referral  3. X-rays of the left shoulder, lumbar spine, thoracic spine  4. Flexeril 5-10 mg up to 3 times daily as needed for low back pain  5. Revisit one month  6. Continue low-dose ranitidine daily

## 2017-06-28 NOTE — ASSESSMENT & PLAN NOTE
Patient reports intermittent strabismus dating back at least several years. Recently she repeatedly feels like there is a hair creating mild discomfort in her right eye intermittently. Typically there is no actual foreign object in her eye. She reports no obvious double vision, eye discharge or mattering. She does not wear glasses.

## 2017-06-28 NOTE — ASSESSMENT & PLAN NOTE
Patient reports she has been taking ibuprofen 200 mg, 4 pills 3 times a day, usually with food. She reports she started getting epigastric burning and substernal pain 2-3 weeks ago. She started taking what sounds like a over-the-counter low dose of ranitidine-? 75 mg, with resolution of the heartburn symptoms in the past week. She is not reporting black or bloody stools.

## 2017-06-28 NOTE — ASSESSMENT & PLAN NOTE
"Patient notes intermittent aching just below her lateral left clavicle every day or 2 sometimes lasting all day sometimes just a few minutes. This is not related to effort. She reports that she is \"double jointed in her left shoulder and use to sublux it for entertainment value when she was a kid. She has not done that for a number of years happily. She denies left arm numbness or radicular pain.  "

## 2017-06-30 ENCOUNTER — HOSPITAL ENCOUNTER (OUTPATIENT)
Dept: LAB | Facility: MEDICAL CENTER | Age: 37
End: 2017-06-30
Attending: FAMILY MEDICINE
Payer: MEDICAID

## 2017-06-30 DIAGNOSIS — Z00.00 HEALTHCARE MAINTENANCE: ICD-10-CM

## 2017-06-30 LAB
ALBUMIN SERPL BCP-MCNC: 4.5 G/DL (ref 3.2–4.9)
ALBUMIN/GLOB SERPL: 1.5 G/DL
ALP SERPL-CCNC: 42 U/L (ref 30–99)
ALT SERPL-CCNC: 18 U/L (ref 2–50)
ANION GAP SERPL CALC-SCNC: 8 MMOL/L (ref 0–11.9)
AST SERPL-CCNC: 17 U/L (ref 12–45)
BILIRUB SERPL-MCNC: 0.3 MG/DL (ref 0.1–1.5)
BUN SERPL-MCNC: 14 MG/DL (ref 8–22)
CALCIUM SERPL-MCNC: 9.6 MG/DL (ref 8.5–10.5)
CHLORIDE SERPL-SCNC: 105 MMOL/L (ref 96–112)
CHOLEST SERPL-MCNC: 212 MG/DL (ref 100–199)
CO2 SERPL-SCNC: 21 MMOL/L (ref 20–33)
CREAT SERPL-MCNC: 0.89 MG/DL (ref 0.5–1.4)
GFR SERPL CREATININE-BSD FRML MDRD: >60 ML/MIN/1.73 M 2
GLOBULIN SER CALC-MCNC: 3 G/DL (ref 1.9–3.5)
GLUCOSE SERPL-MCNC: 129 MG/DL (ref 65–99)
HDLC SERPL-MCNC: 51 MG/DL
LDLC SERPL CALC-MCNC: 132 MG/DL
POTASSIUM SERPL-SCNC: 4.1 MMOL/L (ref 3.6–5.5)
PROT SERPL-MCNC: 7.5 G/DL (ref 6–8.2)
SODIUM SERPL-SCNC: 134 MMOL/L (ref 135–145)
TRIGL SERPL-MCNC: 143 MG/DL (ref 0–149)
TSH SERPL DL<=0.005 MIU/L-ACNC: 1.42 UIU/ML (ref 0.3–3.7)

## 2017-06-30 PROCEDURE — 80061 LIPID PANEL: CPT

## 2017-06-30 PROCEDURE — 36415 COLL VENOUS BLD VENIPUNCTURE: CPT

## 2017-06-30 PROCEDURE — 84443 ASSAY THYROID STIM HORMONE: CPT

## 2017-06-30 PROCEDURE — 80053 COMPREHEN METABOLIC PANEL: CPT

## 2017-07-10 DIAGNOSIS — E78.5 DYSLIPIDEMIA: ICD-10-CM

## 2017-07-11 ENCOUNTER — TELEPHONE (OUTPATIENT)
Dept: MEDICAL GROUP | Facility: MEDICAL CENTER | Age: 37
End: 2017-07-11

## 2017-07-12 NOTE — TELEPHONE ENCOUNTER
----- Message from Ash Meza M.D. sent at 7/3/2017  2:29 PM PDT -----  Labs show normal thyroid,mild elevation of  Cholesterol. Recommend limiting red meat , eggs, cheese intake

## 2017-07-18 RX ORDER — GABAPENTIN 800 MG/1
TABLET ORAL
Qty: 120 TAB | Refills: 6 | Status: SHIPPED | OUTPATIENT
Start: 2017-07-18 | End: 2017-10-16 | Stop reason: SDUPTHER

## 2017-07-25 RX ORDER — GABAPENTIN 300 MG/1
600 CAPSULE ORAL 2 TIMES DAILY
Qty: 180 CAP | Refills: 3 | OUTPATIENT
Start: 2017-07-25

## 2017-07-25 RX ORDER — GABAPENTIN 300 MG/1
600 CAPSULE ORAL 2 TIMES DAILY
Qty: 120 CAP | Refills: 6 | Status: SHIPPED | OUTPATIENT
Start: 2017-07-25 | End: 2018-03-16

## 2017-07-25 NOTE — TELEPHONE ENCOUNTER
Was the patient seen in the last year in this department? Yes     Does patient have an active prescription for medications requested? No     Received Request Via: Pharmacy, pt is without insurance at this time. She spoke with her pharmacy and they told her that this medication is significantly lower in price than the 800 mg tablets. Please advise

## 2017-10-16 RX ORDER — GABAPENTIN 800 MG/1
800 TABLET ORAL 4 TIMES DAILY
Qty: 120 TAB | Refills: 6 | Status: SHIPPED | OUTPATIENT
Start: 2017-10-16 | End: 2018-01-01 | Stop reason: SDUPTHER

## 2018-03-16 RX ORDER — GABAPENTIN 800 MG/1
TABLET ORAL
Qty: 120 TAB | Refills: 1 | Status: SHIPPED | OUTPATIENT
Start: 2018-03-16 | End: 2018-04-09 | Stop reason: SDUPTHER

## 2018-04-10 RX ORDER — GABAPENTIN 800 MG/1
TABLET ORAL
Qty: 120 TAB | Refills: 1 | Status: SHIPPED | OUTPATIENT
Start: 2018-04-10 | End: 2018-05-02 | Stop reason: SDUPTHER

## 2018-05-02 RX ORDER — GABAPENTIN 800 MG/1
TABLET ORAL
Qty: 120 TAB | Refills: 1 | Status: SHIPPED | OUTPATIENT
Start: 2018-05-02 | End: 2018-05-24 | Stop reason: SDUPTHER

## 2018-05-25 RX ORDER — GABAPENTIN 800 MG/1
TABLET ORAL
Qty: 120 TAB | Refills: 0 | Status: SHIPPED | OUTPATIENT
Start: 2018-05-25 | End: 2022-05-28

## 2022-04-18 ENCOUNTER — NON-PROVIDER VISIT (OUTPATIENT)
Dept: OCCUPATIONAL MEDICINE | Facility: CLINIC | Age: 42
End: 2022-04-18

## 2022-04-18 DIAGNOSIS — Z02.1 PRE-EMPLOYMENT DRUG SCREENING: ICD-10-CM

## 2022-04-18 DIAGNOSIS — Z02.1 PRE-EMPLOYMENT HEALTH SCREENING EXAMINATION: ICD-10-CM

## 2022-04-18 LAB
AMP AMPHETAMINE: NORMAL
COC COCAINE: NORMAL
INT CON NEG: NORMAL
INT CON POS: NORMAL
MET METHAMPHETAMINES: NORMAL
OPI OPIATES: NORMAL
PCP PHENCYCLIDINE: NORMAL
POC DRUG COMMENT 753798-OCCUPATIONAL HEALTH: NEGATIVE
THC: NORMAL

## 2022-04-18 PROCEDURE — 80305 DRUG TEST PRSMV DIR OPT OBS: CPT | Performed by: NURSE PRACTITIONER

## 2022-05-25 ENCOUNTER — APPOINTMENT (OUTPATIENT)
Dept: RADIOLOGY | Facility: IMAGING CENTER | Age: 42
End: 2022-05-25
Attending: PHYSICIAN ASSISTANT
Payer: COMMERCIAL

## 2022-05-25 ENCOUNTER — APPOINTMENT (OUTPATIENT)
Dept: RADIOLOGY | Facility: IMAGING CENTER | Age: 42
End: 2022-05-25
Attending: PHYSICIAN ASSISTANT
Payer: MEDICAID

## 2022-05-25 ENCOUNTER — OCCUPATIONAL MEDICINE (OUTPATIENT)
Dept: URGENT CARE | Facility: PHYSICIAN GROUP | Age: 42
End: 2022-05-25
Payer: COMMERCIAL

## 2022-05-25 VITALS
BODY MASS INDEX: 25.69 KG/M2 | DIASTOLIC BLOOD PRESSURE: 78 MMHG | TEMPERATURE: 98.9 F | OXYGEN SATURATION: 98 % | HEIGHT: 62 IN | RESPIRATION RATE: 16 BRPM | HEART RATE: 88 BPM | SYSTOLIC BLOOD PRESSURE: 118 MMHG | WEIGHT: 139.6 LBS

## 2022-05-25 DIAGNOSIS — M25.561 ACUTE PAIN OF RIGHT KNEE: ICD-10-CM

## 2022-05-25 DIAGNOSIS — M25.461 KNEE EFFUSION, RIGHT: ICD-10-CM

## 2022-05-25 DIAGNOSIS — Z02.1 PRE-EMPLOYMENT DRUG SCREENING: Primary | ICD-10-CM

## 2022-05-25 DIAGNOSIS — Q74.2 FIBULAR ABNORMALITY: ICD-10-CM

## 2022-05-25 DIAGNOSIS — Z02.6 ENCOUNTER FOR ASSESSMENT OF WORK-RELATED CAUSATION OF INJURY: ICD-10-CM

## 2022-05-25 PROCEDURE — 99203 OFFICE O/P NEW LOW 30 MIN: CPT | Performed by: PHYSICIAN ASSISTANT

## 2022-05-25 PROCEDURE — 80305 DRUG TEST PRSMV DIR OPT OBS: CPT | Performed by: PHYSICIAN ASSISTANT

## 2022-05-25 PROCEDURE — 73564 X-RAY EXAM KNEE 4 OR MORE: CPT | Mod: TC,RT | Performed by: PHYSICIAN ASSISTANT

## 2022-05-25 RX ORDER — HYDROCODONE BITARTRATE AND ACETAMINOPHEN 5; 325 MG/1; MG/1
1 TABLET ORAL EVERY 8 HOURS PRN
Qty: 12 TABLET | Refills: 0 | Status: SHIPPED | OUTPATIENT
Start: 2022-05-25 | End: 2022-06-01

## 2022-05-25 NOTE — LETTER
[facility]  [address] [place] [address]. [address] - [place] [address]  Phone:  [phone] - Fax:  [phone]   [facility] Progress Report and Disability Certification  Date of Service: 5/25/2022   No Show:  No  Date / Time of Next Visit: 5/28/2022 9:00am   Claim Information   Patient Name: Lesia Ladd  Claim Number:     Employer:   [employer] Date of Injury: 5/24/2022     Insurer / TPA:    ID / SSN:     Occupation:   Diagnosis: Diagnoses of Encounter for assessment of work-related causation of injury, Acute pain of right knee, Fibular abnormality, and Knee effusion, right were pertinent to this visit.    Medical Information   Related to Industrial Injury? 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  She was walking down the driveway, tripped on uneven concrete at one of the seems causing her right knee to twist abnormally and she fell onto her left side.  She did scrape up her elbow and knee on the left but felt immediate onset of pain to the right knee.  She reports immediate onset of pain.  She attempted to continue working but was unable to weight bear.  She has tried ibuprofen and aleve overnight.  She has tried an ice pack.     No prior knee inury and no second job.   Objective Findings:   Knee /Derm.:  There is significant swelling, to the knee.  There is focal tenderness over the fibular head.  There is tenderness along the course of the lateral collateral ligament.  There is tenderness around the medial and inferior patella.  There is tenderness along the medial joint line.  There is notable effusion.  There is tenderness over the patellar tendon and quadricep tendon.  Unable to assess drawer due to pain and swelling.  She is unable to weight-bear.  Distal pulses are intact.  There is no sign  of compartment syndrome.  Sensation is intact distally.       Pre-Existing Condition(s): None   Assessment:   Initial Visit    Status: Discharged / Care Transfer  Comments:Transfer care to orthopedics urgently  Permanent Disability:No    Plan: MedicationTransfer Care  Comments:Alternate Tylenol/ibuprofen as needed for pain, Norco as needed for severe pain at night.    Diagnostics: X-ray    Comments:   2022 11:46 AM     HISTORY/REASON FOR EXAM:  Pain/Deformity Following Trauma  Generalized right knee pain and swelling after twisting the knee during a GLF yesterday at work     TECHNIQUE/EXAM DESCRIPTION AND NUMBER OF VIEWS:  4 views of the RIGHT knee.     COMPARISON: None     FINDINGS:  Lucency in the fibular head. Lucency in the medial femoral condyle.     No joint osteoarthritis     Small knee joint effusion.     IMPRESSION:        Lucencies seen in the fibular head and medial femoral condyle, concerning for fracture. For evaluation with CT is recommended.    Disability Information   Status: Released to Restricted Duty    From:  2022  Through: 2022 Restrictions are: Temporary   Physical Restrictions   Sitting:    Standin hrs/day Stoopin hrs/day Bendin hrs/day   Squattin hrs/day Walkin hrs/day Climbin hrs/day Pushin hrs/day   Pullin hrs/day Other:    Reaching Above Shoulder (L):   Reaching Above Shoulder (R):       Reaching Below Shoulder (L):    Reaching Below Shoulder (R):      Not to exceed Weight Limits   Carrying(hrs):   Weight Limit(lb): < or = to 10 pounds Lifting(hrs):   Weight  Limit(lb): < or = to 10 pounds   Comments: Work restrictions as above  Seated work only, must be allowed to elevate leg and ice frequently throughout the day.  Tylenol/ibuprofen as needed for pain, Norco for severe pain at night  Urgent referral to orthopedics given lucencies seen on x-ray as above worrisome for fracture and ligamentous disruption  Ice 20 minutes every 1-2 hours,  elevate  Knee immobilizer and crutches, nonweightbearing on right lower extremity  Transfer of care to orthopedics where additional imaging will be needed   Return precautions discussed.    Repetitive Actions   Hands: i.e. Fine Manipulations from Grasping:     Feet: i.e. Operating Foot Controls: 0 hrs/day  Comments:right foot   Driving / Operate Machinery: 0 hrs/day   Health Care Provider’s Original or Electronic Signature  Shannon Mcgee P.A.-C. Health Care Provider’s Original or Electronic Signature    Román Redman MD         Clinic Name / Location: [address]  [place] [address] Clinic Phone Number: Dept: [phone]   Appointment Time: 10:25 Am Visit Start Time: 11:04 AM   Check-In Time:  10:28 Am Visit Discharge Time:  12:42PM   Original-Treating Physician or Chiropractor    Page 2-Insurer/TPA    Page 3-Employer    Page 4-Employee

## 2022-05-25 NOTE — PROGRESS NOTES
"Subjective     Lesia Ladd is a 42 y.o. female who presents with Work-Related Injury ( [employer]/R Knee injury/DOI 05/24/22. Pt was walking down side walk when she tripped over foot or concrete and injured R knee. Swelling, difficulty bending/applying pressure. 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  She was walking down the driveway, tripped on uneven concrete at one of the seems causing her right knee to twist abnormally and she fell onto her left side.  She did scrape up her elbow and knee on the left but felt immediate onset of pain to the right knee.  She reports immediate onset of pain.  She attempted to continue working but was unable to weight bear.  She has tried ibuprofen and aleve overnight.  She has tried an ice pack.     No prior knee inury and no second job.            Objective     /78 (BP Location: Right arm, Patient Position: Sitting, BP Cuff Size: Adult)   Pulse 88   Temp 37.2 °C (98.9 °F) (Temporal)   Resp 16   Ht 1.575 m (5' 2\")   Wt 63.3 kg (139 lb 9.6 oz)   SpO2 98%   BMI 25.53 kg/m²      Physical Exam  Vitals and nursing note reviewed.   Constitutional:       General: She is not in acute distress.     Appearance: Normal appearance. She is not ill-appearing.   HENT:      Head: Normocephalic.   Eyes:      Extraocular Movements: Extraocular movements intact.      Pupils: Pupils are equal, round, and reactive to light.   Cardiovascular:      Rate and Rhythm: Normal rate.   Pulmonary:      Effort: Pulmonary effort is normal.   Skin:     General: Skin is warm.      Findings: No rash.   Neurological:      Mental Status: She is alert and oriented to person, place, and time.   Psychiatric:         Thought Content: Thought content normal.         Judgment: Judgment normal.           Knee /Derm.:  There is significant swelling, to the knee.  There is focal " tenderness over the fibular head.  There is tenderness along the course of the lateral collateral ligament.  There is tenderness around the medial and inferior patella.  There is tenderness along the medial joint line.  There is notable effusion.  There is tenderness over the patellar tendon and quadricep tendon.  Unable to assess drawer due to pain and swelling.  She is unable to weight-bear.  Distal pulses are intact.  There is no sign of compartment syndrome.  Sensation is intact distally.         RADIOLOGY RESULTS   DX-KNEE COMPLETE 4+ RIGHT    Result Date: 5/25/2022 5/25/2022 11:46 AM HISTORY/REASON FOR EXAM:  Pain/Deformity Following Trauma Generalized right knee pain and swelling after twisting the knee during a GLF yesterday at work TECHNIQUE/EXAM DESCRIPTION AND NUMBER OF VIEWS:  4 views of the RIGHT knee. COMPARISON: None FINDINGS: Lucency in the fibular head. Lucency in the medial femoral condyle. No joint osteoarthritis Small knee joint effusion.     Lucencies seen in the fibular head and medial femoral condyle, concerning for fracture. For evaluation with CT is recommended.            *X-rays were reviewed and interpreted independently by me. I agree with the radiologist's findings       Assessment & Plan        1. Encounter for assessment of work-related causation of injury    - Referral to Orthopedics    2. Acute pain of right knee    - DX-KNEE COMPLETE 4+ RIGHT; Future  - Referral to Orthopedics    3. Fibular abnormality    - Referral to Orthopedics    4. Knee effusion, right       Work restrictions as above  Seated work only, must be allowed to elevate leg and ice frequently throughout the day.  Tylenol/ibuprofen as needed for pain, Norco for severe pain at night  Urgent referral to orthopedics given lucencies seen on x-ray as above worrisome for fracture and ligamentous disruption  Ice 20 minutes every 1-2 hours, elevate  Knee immobilizer and crutches, nonweightbearing on right lower  extremity  Transfer of care to orthopedics where additional imaging will be needed   Return precautions discussed.

## 2022-05-28 ENCOUNTER — OCCUPATIONAL MEDICINE (OUTPATIENT)
Dept: URGENT CARE | Facility: PHYSICIAN GROUP | Age: 42
End: 2022-05-28
Payer: COMMERCIAL

## 2022-05-28 VITALS
SYSTOLIC BLOOD PRESSURE: 108 MMHG | RESPIRATION RATE: 20 BRPM | TEMPERATURE: 97.9 F | HEART RATE: 83 BPM | BODY MASS INDEX: 25.58 KG/M2 | DIASTOLIC BLOOD PRESSURE: 72 MMHG | HEIGHT: 62 IN | WEIGHT: 139 LBS | OXYGEN SATURATION: 98 %

## 2022-05-28 DIAGNOSIS — Z02.6 ENCOUNTER FOR ASSESSMENT OF WORK-RELATED CAUSATION OF INJURY: ICD-10-CM

## 2022-05-28 DIAGNOSIS — M25.561 ACUTE PAIN OF RIGHT KNEE: ICD-10-CM

## 2022-05-28 PROCEDURE — 99213 OFFICE O/P EST LOW 20 MIN: CPT | Performed by: PHYSICIAN ASSISTANT

## 2022-05-28 RX ORDER — PREDNISONE 10 MG/1
40 TABLET ORAL 2 TIMES DAILY
Qty: 40 TABLET | Refills: 0 | Status: SHIPPED | OUTPATIENT
Start: 2022-05-28 | End: 2022-06-02

## 2022-05-28 NOTE — LETTER
[facility]  [address] [place] [address]. [address] - [place] [address]  Phone:  [phone] - Fax:  [phone]   [facility] Progress Report and Disability Certification  Date of Service: 5/28/2022   No Show:  No  Date / Time of Next Visit:     Claim Information   Patient Name: Lesia Ladd  Claim Number:     Employer:    Date of Injury: 5/24/2022     Insurer / TPA: [employer]  ID / SSN:     Occupation:   Diagnosis: Diagnoses of Acute pain of right knee and Encounter for assessment of work-related causation of injury were pertinent to this visit.    Medical Information   Related to Industrial Injury? Yes    Subjective Complaints:  DOI 05/24/22   MAGDALENA: Pt was walking down sidewalk when she tripped over foot or concrete and injured R knee.    Knee symptoms are same as LOV, no worsing of symptoms. Limited ability for weight bearing. Symptoms are worsened with pivoting movements. Has been wearing brace at all times.  Pain has been keeping her up at night.  Taking aleve and IBU    No pre-existing conditions/injuries with the right knee.  Patient does not have a secondary job   Objective Findings: Right knee joint effusion, ttp over medial and lateral joint line, limited AROM due to pain, antalgic gait.   Pre-Existing Condition(s):     Assessment:   Condition Same    Status: Additional Care Required  Comments:Pending orthopedics referral  Permanent Disability:No    Plan:   Comments:We will prescribe prednisone x5 days, continue use of the brace, continue to take over-the-counter anti-inflammatory medications, follow-up in 7 days for reevaluation at the urgent care or with orthopedics if appointment is scheduled    Diagnostics:      Comments:       Disability Information   Status: Released to Restricted Duty    From:     Through:   Restrictions are:     Physical Restrictions   Sitting:    Standing:    Stooping:    Bending:      Squatting:     Walking:    Climbing:    Pushing:      Pulling:    Other:    Reaching Above Shoulder (L):   Reaching Above Shoulder (R):       Reaching Below Shoulder (L):    Reaching Below Shoulder (R):      Not to exceed Weight Limits   Carrying(hrs):   Weight Limit(lb):   Lifting(hrs):   Weight  Limit(lb):     Comments:      Repetitive Actions   Hands: i.e. Fine Manipulations from Grasping:     Feet: i.e. Operating Foot Controls:     Driving / Operate Machinery:     Health Care Provider’s Original or Electronic Signature  Torres Shelley P.A.-C. Health Care Provider’s Original or Electronic Signature    Román Redman MD         Clinic Name / Location: [address]  [place] [address] Clinic Phone Number: Dept: [phone]   Appointment Time: 9:30 Am Visit Start Time: 9:23 AM   Check-In Time:  9:22 Am Visit Discharge Time:  9:56   Original-Treating Physician or Chiropractor    Page 2-Insurer/TPA    Page 3-Employer    Page 4-Employee

## 2022-05-28 NOTE — LETTER
"EMPLOYEE’S CLAIM FOR COMPENSATION/ REPORT OF INITIAL TREATMENT  FORM C-4    EMPLOYEE’S CLAIM - PROVIDE ALL INFORMATION REQUESTED   First Name  Lesia Last Name  Julee Birthdate                    1980                Sex  female Claim Number (Insurer’s Use Only)    Home Address  [address] Age  42 y.o. Height  1.575 m (5' 2\") Weight  63 kg (139 lb) [facility]     [facility] Zip  [address] Telephone  [phone] (home)    Mailing Address  [address] [facility] Zip  [address] Primary Language Spoken  English    Insurer   Third-Party   [facility]   Employee's Occupation (Job Title) When Injury or Occupational Disease Occurred      Employer's Name/Company Name     Telephone  [phone]    Office Mail Address (Number and Street)   [address]  Zip  [address]    Date of Injury  5/24/2022               Hours Injury  5:00 PM Date Employer Notified  5/24/2022 Last Day of Work after Injury     or Occupational Disease  5/24/2022 Supervisor to Whom Injury     Reported  Adin ARRIAGA   Address or Location of Accident (if applicable)  Work [1]   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 (Be specific an answer in detail. Use additional sheet if necessary)  Walking down their driveway,I trpped on uneven concrete at one of the seams .I fell when the trip laused my knee to twist .   If you believe that you have an occupational disease, when did you first have knowledge of the disability and it relationship to your employment?  N/A Witnesses to the Accident  N/A      Nature of Injury or Occupational Disease  Sprain  Part(s) of Body Injured or Affected  Knee (R), N/A, N/A    I certify that the above is true and correct to the best of my " knowledge and that I have provided this information in order to obtain the benefits of [place]  I hereby authorize any physician, chiropractor, surgeon, practitioner, or other person, any hospital, including [facility] or [facility], any medical service organization, any insurance company, or other institution or organization to release to each other, any medical or other information, including benefits paid or payable, pertinent to this injury or disease, except information relative to diagnosis, treatment and/or counseling for AIDS, psychological conditions, alcohol or controlled substances, for which I must give specific authorization.  A Photostat of this authorization shall be as valid as the original.     Date   Place Employee’s Original or  *Electronic Signature   THIS REPORT MUST BE COMPLETED AND MAILED WITHIN 3 WORKING DAYS OF TREATMENT   Place  [facility]  Name of Facility  [place]   Date  5/28/2022 Diagnosis and Description of Injury or Occupational Disease  (M25.561) Acute pain of right knee  (Z02.6) Encounter for assessment of work-related causation of injury Is there evidence the injured employee was under the influence of alcohol and/or another controlled substance at the time of accident?  ? No ? Yes (if yes, please explain)    Hour  9:23 AM   Diagnoses of Acute pain of right knee and Encounter for assessment of work-related causation of injury were pertinent to this visit. No   Treatment  We will prescribe prednisone x5 days  continue use of the brace  continue to take over-the-counter anti-inflammatory medications  follow-up in 7 days for reevaluation at the urgent care or with orthopedics if appointment is scheduled  Have you advised the patient to remain off work five days or     more?    X-Ray Findings      ? Yes Indicate dates:   From   To     "  From information given by the employee, together with medical evidence, can        you directly connect this injury or occupational disease as job incurred?  Yes ? No If no, is the injured employee capable of:  ? full duty  No ? modified duty  Yes   Is additional medical care by a physician indicated?  Yes If Modified Duty, Specify any Limitations / Restrictions  Desk work only.  No lifting or carrying greater than 5 pounds   Do you know of any previous injury or disease contributing to this condition or occupational disease?  ? Yes ? No (Explain if yes)                          No   Date  5/28/2022 Print Health Care Provider's   Jarrett Shelley P.A.-C. I certify the employer’s copy of  this form was mailed on:   Address  [address] [place] [address]. [address] Insurer’s Use Only     [facility] Zip  [address]    Provider’s Tax ID Number  [id number] Telephone  Dept: [phone]             Health Care Provider’s Original or Electronic Signature  e-JARRETT Avalos P.A.-C. Degree (MD,DO, DC,PASAI,APRN)   PASAI      * Complete and attach Release of Information (Form C-4A) when injured employee signs C-4 Form electronically  ORIGINAL - TREATING HEALTHCARE PROVIDER PAGE 2 - INSURER/TPA PAGE 3 - EMPLOYER PAGE 4 - EMPLOYEE             Form C-4 (rev.08/21)           BRIEF DESCRIPTION OF RIGHTS AND BENEFITS  (Pursuant to [place] 616C.050)    Notice of Injury or Occupational Disease (Incident Report Form C-1): If an injury or occupational disease (OD) arises out of and in the course of employment, you must provide written notice to your employer as soon as practicable, but no later than 7 days after the accident or OD. Your employer shall maintain a sufficient supply of the required forms.    Claim for Compensation (Form C-4): If medical treatment is sought, the form C-4 is available at the place of initial treatment. A completed \"Claim for Compensation\" (Form C-4) must be filed within 90 days after an accident or OD. The treating " physician or chiropractor must, within 3 working days after treatment, complete and mail to the employer, the employer's insurer and third-party , the Claim for Compensation.    Medical Treatment: If you require medical treatment for your on-the-job injury or OD, you may be required to select a physician or chiropractor from a list provided by your workers’ compensation insurer, if it has contracted with an Organization for Managed Care (MCO) or Preferred Provider Organization (PPO) or providers of health care. If your employer has not entered into a contract with an MCO or PPO, you may select a physician or chiropractor from the Panel of Physicians and Chiropractors. Any medical costs related to your industrial injury or OD will be paid by your insurer.    Temporary Total Disability (TTD): If your doctor has certified that you are unable to work for a period of at least 5 consecutive days, or 5 cumulative days in a 20-day period, or places restrictions on you that your employer does not accommodate, you may be entitled to TTD compensation.    Temporary Partial Disability (TPD): If the wage you receive upon reemployment is less than the compensation for TTD to which you are entitled, the insurer may be required to pay you TPD compensation to make up the difference. TPD can only be paid for a maximum of 24 months.    Permanent Partial Disability (PPD): When your medical condition is stable and there is an indication of a PPD as a result of your injury or OD, within 30 days, your insurer must arrange for an evaluation by a rating physician or chiropractor to determine the degree of your PPD. The amount of your PPD award depends on the date of injury, the results of the PPD evaluation, your age and wage.    Permanent Total Disability (PTD): If you are medically certified by a treating physician or chiropractor as permanently and totally disabled and have been granted a PTD status by your insurer, you are  entitled to receive monthly benefits not to exceed 66 2/3% of your average monthly wage. The amount of your PTD payments is subject to reduction if you previously received a lump-sum PPD award.    Vocational Rehabilitation Services: You may be eligible for vocational rehabilitation services if you are unable to return to the job due to a permanent physical impairment or permanent restrictions as a result of your injury or occupational disease.    Transportation and Per [place]ul Reimbursement: You may be eligible for travel expenses and per nakul associated with medical treatment.    Reopening: You may be able to reopen your claim if your condition worsens after claim closure.     Appeal Process: If you disagree with a written determination issued by the insurer or the insurer does not respond to your request, you may appeal to the Department of Administration, , by following the instructions contained in your determination letter. You must appeal the determination within 70 days from the date of the determination letter at [address], [address], [place], [place] [address], or [address], [address], [place] [address]. If you disagree with the  decision, you may appeal to the Department of Administration, . You must file your appeal within 30 days from the date of the  decision letter at [address], [address], [place], [place] [address], or [address], [address], [place] [address]. If you disagree with a decision of an , you may file a petition for judicial review with the District Court. You must do so within 30 days of the Appeal Officer’s decision. You may be represented by an  at your own expense or you may contact the [facility] for possible representation.    [place]  for Injured Workers (NAIW): If you disagree with a  decision, you may request that NAIW represent  you without charge at an  Hearing. For information regarding denial of benefits, you may contact the [facility] at: [address], [address], [place] [address], [phone], or [address] [facility], [address], [place] [address], [phone]    To File a Complaint with the Division: If you wish to file a complaint with the  of the Division of Industrial Relations (DIR),  please contact the Workers’ Compensation Section, [address], [address], [place], [place] [address], telephone (018) [phone], or [address], [address], [place], [place] [address], telephone [phone].    For assistance with Workers’ Compensation Issues: You may contact the [facility] Office for Consumer Health Assistance, [address], [place], [place] [address], Toll Free 1-321.141.8348, Web site: http://[facility].[place].gov/Programs/AMERICA E-mail: [email or web address]@[email or web address].[email or web address].[facility]              __________________________________________________________________                                    _________________            Employee Name / Signature                                                                                                                            Date                                                                                                                                                                                                                              D-2 (rev. 10/20)

## 2022-05-28 NOTE — PROGRESS NOTES
"Subjective:     Lesia Ladd is a 42 y.o. female who presents for Follow-Up (Right knee )      DOI 05/24/22   MAGDALENA: Pt was walking down sidewalk when she tripped over foot or concrete and injured R knee.    Knee symptoms are same as LOV, no worsing of symptoms. Limited ability for weight bearing. Symptoms are worsened with pivoting movements. Has been wearing brace at all times.  Pain has been keeping her up at night.  Taking aleve and IBU    No pre-existing conditions/injuries with the right knee.  Patient does not have a secondary job    PMH:   No pertinent past medical history to this problem  MEDS:  Medications were reviewed in EMR  ALLERGIES:  Allergies were reviewed in EMR  SOCHX:  Reviewed.  No pertinent social history  FH:   No pertinent family history to this problem       Objective:     /72 (BP Location: Right arm, Patient Position: Sitting, BP Cuff Size: Adult)   Pulse 83   Temp 36.6 °C (97.9 °F) (Temporal)   Resp 20   Ht 1.575 m (5' 2\")   Wt 63 kg (139 lb)   SpO2 98%   BMI 25.42 kg/m²     Right knee joint effusion, ttp over medial and lateral joint line, limited AROM due to pain, antalgic gait.    Assessment/Plan:       1. Acute pain of right knee  - predniSONE (DELTASONE) 10 MG Tab; Take 4 Tablets by mouth 2 times a day for 5 days.  Dispense: 40 Tablet; Refill: 0    2. Encounter for assessment of work-related causation of injury    We will prescribe prednisone x5 days  continue use of the brace, continue to take over-the-counter anti-inflammatory medications  follow-up in 7 days for reevaluation at the urgent care or with orthopedics if appointment is scheduled    Differential diagnosis, natural history, supportive care, and indications for immediate follow-up discussed.    "

## 2022-06-04 ENCOUNTER — OCCUPATIONAL MEDICINE (OUTPATIENT)
Dept: URGENT CARE | Facility: PHYSICIAN GROUP | Age: 42
End: 2022-06-04
Payer: COMMERCIAL

## 2022-06-04 VITALS
OXYGEN SATURATION: 98 % | RESPIRATION RATE: 20 BRPM | TEMPERATURE: 98.6 F | BODY MASS INDEX: 25.58 KG/M2 | WEIGHT: 139 LBS | HEIGHT: 62 IN | SYSTOLIC BLOOD PRESSURE: 98 MMHG | DIASTOLIC BLOOD PRESSURE: 62 MMHG | HEART RATE: 82 BPM

## 2022-06-04 DIAGNOSIS — Q74.2 FIBULAR ABNORMALITY: ICD-10-CM

## 2022-06-04 DIAGNOSIS — M25.561 ACUTE PAIN OF RIGHT KNEE: ICD-10-CM

## 2022-06-04 DIAGNOSIS — M25.461 KNEE EFFUSION, RIGHT: ICD-10-CM

## 2022-06-04 PROCEDURE — 99214 OFFICE O/P EST MOD 30 MIN: CPT

## 2022-06-04 ASSESSMENT — ENCOUNTER SYMPTOMS
EYES NEGATIVE: 1
RESPIRATORY NEGATIVE: 1
CARDIOVASCULAR NEGATIVE: 1
GASTROINTESTINAL NEGATIVE: 1
CONSTITUTIONAL NEGATIVE: 1

## 2022-06-04 NOTE — PROGRESS NOTES
"Subjective     Lesia Ladd is a 42 y.o. female who presents with Follow-Up      DOI 05/24/22     MAGDALENA: Patient was walking down sidewalk when she tripped over an uneven concrete and felt that she injured her right knee.  Patient is here today for a 3rd visit. Her right knee xray showed lucencies seen in the fibular head and medial femoral condyle, concerning for fracture. For evaluation with CT is recommended. Patient was referred to ortho, reports she has not heard from them yet. Patient reports that her knee is better overall, reports only 50% improvement. She reports pain is throbbing on the knee, radiating to the lower leg. She reports pain is 6/10 with rest and 8/10 with activity. She reports pain is aggravated by walking and relieved by rest and brace.  She reports ibuprofen helps some. She has not taken the prescribed prednisone due to fear of side effects. She reports feeling weakness on the right leg. Patient is not working right now, states her restrictions cannot be accommodated by the company she works for.      HPI- see above    Review of Systems   Constitutional: Negative.    HENT: Negative.    Eyes: Negative.    Respiratory: Negative.    Cardiovascular: Negative.    Gastrointestinal: Negative.    Genitourinary: Negative.    Musculoskeletal:        Knee pain and leg- left   Skin: Negative.               Objective     BP (!) 98/62 (BP Location: Right arm, Patient Position: Sitting, BP Cuff Size: Adult)   Pulse 82   Temp 37 °C (98.6 °F) (Temporal)   Resp 20   Ht 1.575 m (5' 2\")   Wt 63 kg (139 lb)   SpO2 98%   BMI 25.42 kg/m²      Physical Exam  Constitutional:       Appearance: Normal appearance.   HENT:      Head: Normocephalic.      Nose: Nose normal.   Eyes:      Extraocular Movements: Extraocular movements intact.   Cardiovascular:      Rate and Rhythm: Normal rate and regular rhythm.      Pulses: Normal pulses.      Heart sounds: Normal heart sounds.   Pulmonary:      Effort: Pulmonary " effort is normal.      Breath sounds: Normal breath sounds.   Musculoskeletal:         General: Swelling and tenderness present.      Cervical back: Normal range of motion.      Right knee: Swelling present. Decreased range of motion. Tenderness present over the lateral joint line and patellar tendon.      Left knee: Normal.        Legs:    Skin:     General: Skin is warm.   Neurological:      General: No focal deficit present.      Mental Status: She is alert.   Psychiatric:         Mood and Affect: Mood normal.         Behavior: Behavior normal.            Assessment & Plan        1. Acute pain of right knee    - Referral to Occupational Medicine    2. Fibular abnormality    - Referral to Occupational Medicine    3. Knee effusion, right    Patient is here today for her third visit and a work injury that occurred 5/24/2022.  Patient was prescribed prednisone but did not take place due to fear of side effects.  Discussed the benefits of prednisone, recommended taking it with food to avoid side effects.  Patient is also referred to orthopedics, reports she has not heard from them.  Patient reports improvement only on 50%, still with significant pain and loss of function.  Patient has not been working, states that patient could not accommodate her restrictions.  Restrictions were adjusted, discussed this with patient and she is agreeable with plan.  Patient is referred to occupational medicine, can continue to follow-up there while waiting for Ortho referral. Recommended RICE (rest, ice, compression, elevation).  May take ibuprofen up to 800 mg every 8 hours as needed for pain relief.  Advised to apply ice or heat to the area.  May apply topical analgesics or patches for additional pain relief. Discussed treatment plan with patient, she is agreeable and verbalized understanding.  Educated patient on signs and symptoms watch out for, when to return to clinic or go to the ER.    Differential diagnoses, supportive care,  and indications for immediate follow-up discussed with patient. Pathogenesis of diagnosis discussed including typical length and natural progression.     Instructed to return to clinic or nearest emergency department for any change in condition, further concerns, or worsening of symptoms.    Electronically Signed by JANIS Gallegos

## 2022-06-15 ENCOUNTER — OCCUPATIONAL MEDICINE (OUTPATIENT)
Dept: OCCUPATIONAL MEDICINE | Facility: CLINIC | Age: 42
End: 2022-06-15
Payer: COMMERCIAL

## 2022-06-15 VITALS
RESPIRATION RATE: 12 BRPM | HEIGHT: 62 IN | HEART RATE: 83 BPM | BODY MASS INDEX: 25.58 KG/M2 | DIASTOLIC BLOOD PRESSURE: 58 MMHG | TEMPERATURE: 98.4 F | SYSTOLIC BLOOD PRESSURE: 116 MMHG | OXYGEN SATURATION: 98 % | WEIGHT: 139 LBS

## 2022-06-15 DIAGNOSIS — S83.91XD SPRAIN OF RIGHT KNEE, UNSPECIFIED LIGAMENT, SUBSEQUENT ENCOUNTER: ICD-10-CM

## 2022-06-15 PROCEDURE — 99203 OFFICE O/P NEW LOW 30 MIN: CPT | Performed by: PREVENTIVE MEDICINE

## 2022-06-15 NOTE — LETTER
[address],   [address] [place] [address]  Phone:  [phone] - Fax:  [phone]   Occupational Health [facility] Progress Report and Disability Certification  Date of Service: 6/15/2022   No Show:  No  Date / Time of Next Visit: 7/6/2022 @ 9:45AM   Claim Information   Patient Name: Lesia Ladd  Claim Number:     Employer:   [email or web address] Date of Injury: 5/24/2022     Insurer / TPA: [employer]  ID / SSN:     Occupation:   Diagnosis: The encounter diagnosis was Sprain of right knee, unspecified ligament, subsequent encounter.    Medical Information   Related to Industrial Injury? 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 MAGDALENA: Patient was walking down sidewalk when she tripped over an uneven concrete, fell and twisted her right knee and then landed on her LEFT side, did not land on right knee but the right knee was twisted and is the area of greatest injury.. She was seen in [facility] Her right knee xray showed lucencies seen in the fibular head and medial femoral condyle, concerning for fracture. CT was recommended, patient was given referral to Orthopedics.  Patient states overall right knee has improved significantly.  She is able to walk with mild discomfort.  She states that she does have a feeling of laxity occasionally while walking.  Claim is yet to be excepted and patient has not received approval for orthopedic specialist.   Objective Findings: Right Knee: No gross deformity.  Tenderness over the lateral knee diffusely, including the fibular head.  Tenderness over so over the medial knee including medial joint line.  No laxity with varus or valgus stress.  Anterior/posterior drawer test negative.  Bernardino's indeterminate.   Pre-Existing Condition(s):     Assessment:   Condition Improved    Status: Additional Care Required  Permanent Disability:No    Plan:       Diagnostics:      Comments:  Patient's symptoms and mechanism of injury more consistent with sprain/ligamentous injury will order MRI right knee for further clarification  Referral orthopedics pending approval  Gradually increase walking as tolerated  OTC ibuprofen/Tylenol as needed  Restricted duty  Follow-up 3 weeks, sooner if needed    Disability Information   Status: Released to Restricted Duty    From:  6/15/2022  Through: 7/6/2022 Restrictions are: Temporary   Physical Restrictions   Sitting:    Standing:  < or = to 4 hrs/day Stooping:    Bending:      Squatting:  < or = to 1 hr/day Walking:  < or = to 2 hrs/day Climbing:    Pushing:      Pulling:    Other:    Reaching Above Shoulder (L):   Reaching Above Shoulder (R):       Reaching Below Shoulder (L):    Reaching Below Shoulder (R):      Not to exceed Weight Limits   Carrying(hrs):   Weight Limit(lb): < or = to 25 pounds Lifting(hrs):   Weight  Limit(lb): < or = to 25 pounds   Comments:      Repetitive Actions   Hands: i.e. Fine Manipulations from Grasping:     Feet: i.e. Operating Foot Controls:     Driving / Operate Machinery:     Health Care Provider’s Original or Electronic Signature  Russell Miranda D.O. Health Care Provider’s Original or Electronic Signature    Román Redman MD         Clinic Name / Location: [address] [address] Clinic Phone Number: Dept: [phone]   Appointment Time: 9:45 Am Visit Start Time: 9:41 AM   Check-In Time:  9:30 Am Visit Discharge Time:  10:30AM   Original-Treating Physician or Chiropractor    Page 2-Insurer/TPA    Page 3-Employer    Page 4-Employee

## 2022-06-15 NOTE — PROGRESS NOTES
"Subjective:     Lesia Ladd is a 42 y.o. 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 MAGDALENA: Patient was walking down sidewalk when she tripped over an uneven concrete, fell and twisted her right knee and then landed on her LEFT side, did not land on right knee but the right knee was twisted and is the area of greatest injury.. She was seen in [facility] Her right knee xray showed lucencies seen in the fibular head and medial femoral condyle, concerning for fracture. CT was recommended, patient was given referral to Orthopedics.  Patient states overall right knee has improved significantly.  She is able to walk with mild discomfort.  She states that she does have a feeling of laxity occasionally while walking.  Claim is yet to be excepted and patient has not received approval for orthopedic specialist.    ROS: All systems were reviewed on intake form, form was reviewed and signed. See scanned documents in media. Pertinent positives and negatives included in HPI.    PMH: No pertinent past medical history to this problem  MEDS: Medications were reviewed in Epic  ALLERGIES: No Known Allergies  SOCHX: Works as a  at [employer]  FH: No pertinent family history to this problem       Objective:     /58   Pulse 83   Temp 36.9 °C (98.4 °F) (Temporal)   Resp 12   Ht 1.575 m (5' 2\")   Wt 63 kg (139 lb)   SpO2 98%   BMI 25.42 kg/m²     Constitutional: Patient is in no acute distress. Appears well-developed and well-nourished.   HENT: Normocephalic and atraumatic. EOM are normal. No scleral icterus.   Cardiovascular: Normal rate.    Pulmonary/Chest: Effort normal. No respiratory distress.   Neurological: Patient is alert and oriented to person, place, and time.   Skin: Skin is warm and dry.   Psychiatric: Normal mood and affect. Behavior is normal.     Right Knee: No gross deformity.  Tenderness over the lateral " knee diffusely, including the fibular head.  Tenderness over so over the medial knee including medial joint line.  No laxity with varus or valgus stress.  Anterior/posterior drawer test negative.  Bernardino's indeterminate.    Assessment/Plan:       1. Sprain of right knee, unspecified ligament, subsequent encounter  - Referral to Radiology  - MR-KNEE-W/O RIGHT; Future  - Referral to Physical Therapy    Released to Restricted Duty FROM 6/15/2022 TO 7/6/2022     Patient's symptoms and mechanism of injury more consistent with sprain/ligamentous injury will order MRI right knee for further clarification  Referral orthopedics pending approval  Gradually increase walking as tolerated  OTC ibuprofen/Tylenol as needed  Restricted duty  Follow-up 3 weeks, sooner if needed    Differential diagnosis, natural history, supportive care, and indications for immediate follow-up discussed.    Approximately 35 minutes were spent in reviewing notes, preparing for visit, obtaining history, exam and evaluation, patient counseling/education and post visit documentation/orders.